# Patient Record
Sex: FEMALE
[De-identification: names, ages, dates, MRNs, and addresses within clinical notes are randomized per-mention and may not be internally consistent; named-entity substitution may affect disease eponyms.]

---

## 2017-03-07 PROBLEM — Z85.828 PERSONAL HISTORY OF OTHER MALIGNANT NEOPLASM OF SKIN: Status: ACTIVE | Noted: 2017-03-07

## 2017-03-18 ENCOUNTER — CHARTING TRANS (OUTPATIENT)
Dept: OTHER | Age: 57
End: 2017-03-18

## 2017-03-18 ENCOUNTER — HOSPITAL (OUTPATIENT)
Dept: OTHER | Age: 57
End: 2017-03-18
Attending: SURGERY

## 2017-03-31 PROBLEM — M16.11 ARTHRITIS OF RIGHT HIP: Status: ACTIVE | Noted: 2017-03-31

## 2018-01-30 PROBLEM — M47.816 LUMBAR SPONDYLOSIS: Status: ACTIVE | Noted: 2018-01-30

## 2018-01-30 PROBLEM — M48.061 FORAMINAL STENOSIS OF LUMBAR REGION: Status: ACTIVE | Noted: 2018-01-30

## 2018-02-14 PROBLEM — Z12.11 SCREENING FOR MALIGNANT NEOPLASM OF COLON: Status: ACTIVE | Noted: 2018-02-14

## 2018-06-01 PROCEDURE — 82746 ASSAY OF FOLIC ACID SERUM: CPT | Performed by: INTERNAL MEDICINE

## 2018-06-01 PROCEDURE — 82607 VITAMIN B-12: CPT | Performed by: INTERNAL MEDICINE

## 2018-07-11 ENCOUNTER — HOSPITAL ENCOUNTER (EMERGENCY)
Facility: HOSPITAL | Age: 58
Discharge: HOME | End: 2018-07-11
Attending: EMERGENCY MEDICINE
Payer: COMMERCIAL

## 2018-07-11 VITALS
HEART RATE: 72 BPM | BODY MASS INDEX: 26.68 KG/M2 | TEMPERATURE: 99 F | WEIGHT: 170 LBS | DIASTOLIC BLOOD PRESSURE: 85 MMHG | HEIGHT: 67 IN | SYSTOLIC BLOOD PRESSURE: 135 MMHG | RESPIRATION RATE: 18 BRPM

## 2018-07-11 DIAGNOSIS — B02.30 ZOSTER OPHTHALMICUS: Primary | ICD-10-CM

## 2018-07-11 DIAGNOSIS — L03.211 CELLULITIS OF FACE: ICD-10-CM

## 2018-07-11 PROCEDURE — 99283 EMERGENCY DEPT VISIT LOW MDM: CPT

## 2018-07-11 RX ORDER — CLINDAMYCIN HYDROCHLORIDE 300 MG/1
CAPSULE ORAL 3 TIMES DAILY
COMMUNITY
End: 2018-09-09 | Stop reason: ALTCHOICE

## 2018-07-11 RX ORDER — CEPHALEXIN 500 MG/1
500 CAPSULE ORAL 3 TIMES DAILY
Qty: 21 CAPSULE | Refills: 0 | Status: SHIPPED | OUTPATIENT
Start: 2018-07-11 | End: 2018-07-25

## 2018-07-11 RX ORDER — CEPHALEXIN 500 MG/1
500 CAPSULE ORAL ONCE
Status: COMPLETED | OUTPATIENT
Start: 2018-07-11 | End: 2018-07-11

## 2018-07-11 RX ORDER — VALACYCLOVIR HYDROCHLORIDE 1 G/1
TABLET, FILM COATED ORAL EVERY 12 HOURS SCHEDULED
COMMUNITY
End: 2018-09-09 | Stop reason: ALTCHOICE

## 2018-07-11 NOTE — ED PROVIDER NOTES
Patient Seen in: [facility] Emergency Department    History   Patient presents with:  Rash Skin Problem (integumentary)    Stated Complaint: poss cellulitis, poss shingles    HPI    21-year-old female presents to the emergency department for evaluat reviewed and negative except as noted above.     Physical Exam   ED Triage Vitals [07/11/18 0039]  BP: 135/85  Pulse: 72  Resp: 18  Temp: 98.7 °F (37.1 °C)  Temp src: Temporal  SpO2: n/a  O2 Device: n/a    Current:/85   Pulse 72   Temp 98.7 °F (37.1 °

## 2018-07-11 NOTE — ED NOTES
Pt is currently being treated for \"shingles and cellulitis\" to rt side face and rt eye. Pt states she feels cellulitis is spreading to left side now. Pt has no other complaints.

## 2018-09-09 PROCEDURE — 87660 TRICHOMONAS VAGIN DIR PROBE: CPT | Performed by: PHYSICIAN ASSISTANT

## 2018-09-09 PROCEDURE — 87510 GARDNER VAG DNA DIR PROBE: CPT | Performed by: PHYSICIAN ASSISTANT

## 2018-09-09 PROCEDURE — 87252 VIRUS INOCULATION TISSUE: CPT | Performed by: PHYSICIAN ASSISTANT

## 2018-09-09 PROCEDURE — 87480 CANDIDA DNA DIR PROBE: CPT | Performed by: PHYSICIAN ASSISTANT

## 2018-12-14 RX ORDER — HYDROCODONE BITARTRATE AND ACETAMINOPHEN 5; 325 MG/1; MG/1
1 TABLET ORAL EVERY 6 HOURS PRN
Status: ON HOLD | COMMUNITY
End: 2019-01-10

## 2018-12-22 ENCOUNTER — LABORATORY ENCOUNTER (OUTPATIENT)
Dept: LAB | Facility: HOSPITAL | Age: 58
End: 2018-12-22
Payer: COMMERCIAL

## 2018-12-22 DIAGNOSIS — Z01.818 PRE-OP TESTING: ICD-10-CM

## 2018-12-22 DIAGNOSIS — E55.9 VITAMIN D DEFICIENCY: ICD-10-CM

## 2018-12-22 DIAGNOSIS — M16.11 PRIMARY OSTEOARTHRITIS OF RIGHT HIP: ICD-10-CM

## 2018-12-22 PROCEDURE — 86900 BLOOD TYPING SEROLOGIC ABO: CPT

## 2018-12-22 PROCEDURE — 82306 VITAMIN D 25 HYDROXY: CPT

## 2018-12-22 PROCEDURE — 81001 URINALYSIS AUTO W/SCOPE: CPT

## 2018-12-22 PROCEDURE — 36415 COLL VENOUS BLD VENIPUNCTURE: CPT

## 2018-12-22 PROCEDURE — 86901 BLOOD TYPING SEROLOGIC RH(D): CPT

## 2018-12-22 PROCEDURE — 87081 CULTURE SCREEN ONLY: CPT

## 2018-12-22 PROCEDURE — 86850 RBC ANTIBODY SCREEN: CPT

## 2018-12-22 PROCEDURE — 85025 COMPLETE CBC W/AUTO DIFF WBC: CPT

## 2018-12-22 PROCEDURE — 87086 URINE CULTURE/COLONY COUNT: CPT

## 2018-12-22 PROCEDURE — 80053 COMPREHEN METABOLIC PANEL: CPT

## 2018-12-27 ENCOUNTER — HOSPITAL ENCOUNTER (OUTPATIENT)
Dept: GENERAL RADIOLOGY | Age: 58
Discharge: HOME | End: 2018-12-27
Attending: ORTHOPAEDIC SURGERY
Payer: COMMERCIAL

## 2018-12-27 DIAGNOSIS — M16.11 PRIMARY OSTEOARTHRITIS OF RIGHT HIP: ICD-10-CM

## 2018-12-27 PROCEDURE — 71046 X-RAY EXAM CHEST 2 VIEWS: CPT | Performed by: ORTHOPAEDIC SURGERY

## 2019-01-04 PROBLEM — M16.11 OSTEOARTHRITIS OF RIGHT HIP: Status: ACTIVE | Noted: 2019-01-04

## 2019-01-04 NOTE — DISCHARGE SUMMARY
Ortho Discharge Summary     Patient ID:  Shivani Davison  [id number]  63year old  8/3/1960      Admit Date:1/10/2019    Discharge Date and Time: 1/11/2019    Attending Physician: Aubrey Santo MD     Reason for admission: right hip DJD    Discharge Diagnos

## 2019-01-09 ENCOUNTER — ANESTHESIA EVENT (OUTPATIENT)
Dept: SURGERY | Facility: HOSPITAL | Age: 59
End: 2019-01-09

## 2019-01-10 ENCOUNTER — HOSPITAL ENCOUNTER (INPATIENT)
Facility: HOSPITAL | Age: 59
LOS: 1 days | Discharge: HOME HEALTH | DRG: 470 | End: 2019-01-11
Attending: ORTHOPAEDIC SURGERY | Admitting: ORTHOPAEDIC SURGERY
Payer: COMMERCIAL

## 2019-01-10 ENCOUNTER — APPOINTMENT (OUTPATIENT)
Dept: GENERAL RADIOLOGY | Facility: HOSPITAL | Age: 59
DRG: 470 | End: 2019-01-10
Attending: PHYSICIAN ASSISTANT
Payer: COMMERCIAL

## 2019-01-10 ENCOUNTER — ANESTHESIA (OUTPATIENT)
Dept: SURGERY | Facility: HOSPITAL | Age: 59
End: 2019-01-10

## 2019-01-10 DIAGNOSIS — M16.11 PRIMARY OSTEOARTHRITIS OF RIGHT HIP: Primary | ICD-10-CM

## 2019-01-10 LAB
ALBUMIN SERPL-MCNC: 2.9 G/DL (ref 3.1–4.5)
ALBUMIN/GLOB SERPL: 1 {RATIO} (ref 1–2)
ALP LIVER SERPL-CCNC: 62 U/L (ref 46–118)
ALT SERPL-CCNC: 27 U/L (ref 14–54)
ANION GAP SERPL CALC-SCNC: 5 MMOL/L (ref 0–18)
AST SERPL-CCNC: 21 U/L (ref 15–41)
BILIRUB SERPL-MCNC: 0.3 MG/DL (ref 0.1–2)
BUN BLD-MCNC: 12 MG/DL (ref 8–20)
BUN/CREAT SERPL: 21.4 (ref 10–20)
CALCIUM BLD-MCNC: 8.1 MG/DL (ref 8.3–10.3)
CHLORIDE SERPL-SCNC: 104 MMOL/L (ref 101–111)
CO2 SERPL-SCNC: 28 MMOL/L (ref 22–32)
CREAT BLD-MCNC: 0.56 MG/DL (ref 0.55–1.02)
ERYTHROCYTE [DISTWIDTH] IN BLOOD BY AUTOMATED COUNT: 12.1 % (ref 11.5–16)
GLOBULIN PLAS-MCNC: 2.8 G/DL (ref 2.8–4.4)
GLUCOSE BLD-MCNC: 143 MG/DL (ref 70–99)
HCT VFR BLD AUTO: 34.3 % (ref 34–50)
HGB BLD-MCNC: 11.1 G/DL (ref 12–16)
M PROTEIN MFR SERPL ELPH: 5.7 G/DL (ref 6.4–8.2)
MCH RBC QN AUTO: 30.5 PG (ref 27–33.2)
MCHC RBC AUTO-ENTMCNC: 32.4 G/DL (ref 31–37)
MCV RBC AUTO: 94.2 FL (ref 81–100)
OSMOLALITY SERPL CALC.SUM OF ELEC: 286 MOSM/KG (ref 275–295)
PLATELET # BLD AUTO: 250 10(3)UL (ref 150–450)
POTASSIUM SERPL-SCNC: 3.6 MMOL/L (ref 3.6–5.1)
RBC # BLD AUTO: 3.64 X10(6)UL (ref 3.8–5.1)
RED CELL DISTRIBUTION WIDTH-SD: 42.5 FL (ref 35.1–46.3)
SODIUM SERPL-SCNC: 137 MMOL/L (ref 136–144)
WBC # BLD AUTO: 11.8 X10(3) UL (ref 4–13)

## 2019-01-10 PROCEDURE — 80053 COMPREHEN METABOLIC PANEL: CPT | Performed by: PHYSICIAN ASSISTANT

## 2019-01-10 PROCEDURE — 97116 GAIT TRAINING THERAPY: CPT

## 2019-01-10 PROCEDURE — 88304 TISSUE EXAM BY PATHOLOGIST: CPT | Performed by: ORTHOPAEDIC SURGERY

## 2019-01-10 PROCEDURE — 73502 X-RAY EXAM HIP UNI 2-3 VIEWS: CPT | Performed by: PHYSICIAN ASSISTANT

## 2019-01-10 PROCEDURE — 88311 DECALCIFY TISSUE: CPT | Performed by: ORTHOPAEDIC SURGERY

## 2019-01-10 PROCEDURE — 85027 COMPLETE CBC AUTOMATED: CPT | Performed by: PHYSICIAN ASSISTANT

## 2019-01-10 PROCEDURE — 97161 PT EVAL LOW COMPLEX 20 MIN: CPT

## 2019-01-10 PROCEDURE — 0SR903Z REPLACEMENT OF RIGHT HIP JOINT WITH CERAMIC SYNTHETIC SUBSTITUTE, OPEN APPROACH: ICD-10-PCS | Performed by: ORTHOPAEDIC SURGERY

## 2019-01-10 DEVICE — SCREW BN 6.5MM 20MM CANC TI: Type: IMPLANTABLE DEVICE | Site: HIP | Status: FUNCTIONAL

## 2019-01-10 DEVICE — IMPLANTABLE DEVICE: Type: IMPLANTABLE DEVICE | Site: HIP | Status: FUNCTIONAL

## 2019-01-10 DEVICE — LINER ACET MP8 NEUT 40MM NLTX: Type: IMPLANTABLE DEVICE | Site: HIP | Status: FUNCTIONAL

## 2019-01-10 DEVICE — SHELL ACET HEMI 54MM STRL HIP: Type: IMPLANTABLE DEVICE | Site: HIP | Status: FUNCTIONAL

## 2019-01-10 DEVICE — SYS THR 12 STNDOF STRL P2 POR: Type: IMPLANTABLE DEVICE | Site: HIP | Status: FUNCTIONAL

## 2019-01-10 DEVICE — SCREW BN 6.5MM 15MM CANC TI: Type: IMPLANTABLE DEVICE | Site: HIP | Status: FUNCTIONAL

## 2019-01-10 RX ORDER — METOCLOPRAMIDE HYDROCHLORIDE 5 MG/ML
10 INJECTION INTRAMUSCULAR; INTRAVENOUS AS NEEDED
Status: DISCONTINUED | OUTPATIENT
Start: 2019-01-10 | End: 2019-01-10 | Stop reason: HOSPADM

## 2019-01-10 RX ORDER — METOCLOPRAMIDE HYDROCHLORIDE 5 MG/ML
10 INJECTION INTRAMUSCULAR; INTRAVENOUS EVERY 6 HOURS PRN
Status: DISCONTINUED | OUTPATIENT
Start: 2019-01-10 | End: 2019-01-11

## 2019-01-10 RX ORDER — HYDROMORPHONE HYDROCHLORIDE 1 MG/ML
INJECTION, SOLUTION INTRAMUSCULAR; INTRAVENOUS; SUBCUTANEOUS
Status: COMPLETED
Start: 2019-01-10 | End: 2019-01-10

## 2019-01-10 RX ORDER — POLYETHYLENE GLYCOL 3350 17 G/17G
17 POWDER, FOR SOLUTION ORAL DAILY PRN
Status: DISCONTINUED | OUTPATIENT
Start: 2019-01-10 | End: 2019-01-11

## 2019-01-10 RX ORDER — SCOLOPAMINE TRANSDERMAL SYSTEM 1 MG/1
PATCH, EXTENDED RELEASE TRANSDERMAL
Status: COMPLETED
Start: 2019-01-10 | End: 2019-01-10

## 2019-01-10 RX ORDER — ACETAMINOPHEN 325 MG/1
650 TABLET ORAL EVERY 6 HOURS SCHEDULED
Status: DISCONTINUED | OUTPATIENT
Start: 2019-01-10 | End: 2019-01-11

## 2019-01-10 RX ORDER — ONDANSETRON 4 MG/1
4 TABLET, FILM COATED ORAL EVERY 8 HOURS PRN
Qty: 20 TABLET | Refills: 0 | Status: SHIPPED | OUTPATIENT
Start: 2019-01-10 | End: 2019-07-19 | Stop reason: ALTCHOICE

## 2019-01-10 RX ORDER — HYDROCODONE BITARTRATE AND ACETAMINOPHEN 5; 325 MG/1; MG/1
2 TABLET ORAL AS NEEDED
Status: DISCONTINUED | OUTPATIENT
Start: 2019-01-10 | End: 2019-01-10 | Stop reason: HOSPADM

## 2019-01-10 RX ORDER — CLINDAMYCIN PHOSPHATE 900 MG/50ML
INJECTION INTRAVENOUS
Status: DISPENSED
Start: 2019-01-10 | End: 2019-01-10

## 2019-01-10 RX ORDER — TRAMADOL HYDROCHLORIDE 50 MG/1
TABLET ORAL EVERY 4 HOURS PRN
Qty: 60 TABLET | Refills: 0 | Status: SHIPPED | OUTPATIENT
Start: 2019-01-10 | End: 2019-07-19 | Stop reason: ALTCHOICE

## 2019-01-10 RX ORDER — DIPHENHYDRAMINE HYDROCHLORIDE 50 MG/ML
12.5 INJECTION INTRAMUSCULAR; INTRAVENOUS AS NEEDED
Status: DISCONTINUED | OUTPATIENT
Start: 2019-01-10 | End: 2019-01-10 | Stop reason: HOSPADM

## 2019-01-10 RX ORDER — MEPERIDINE HYDROCHLORIDE 25 MG/ML
12.5 INJECTION INTRAMUSCULAR; INTRAVENOUS; SUBCUTANEOUS AS NEEDED
Status: DISCONTINUED | OUTPATIENT
Start: 2019-01-10 | End: 2019-01-10 | Stop reason: HOSPADM

## 2019-01-10 RX ORDER — ACETAMINOPHEN 500 MG
1000 TABLET ORAL ONCE
Status: DISCONTINUED | OUTPATIENT
Start: 2019-01-10 | End: 2019-01-10

## 2019-01-10 RX ORDER — MAGNESIUM HYDROXIDE 1200 MG/15ML
LIQUID ORAL CONTINUOUS PRN
Status: COMPLETED | OUTPATIENT
Start: 2019-01-10 | End: 2019-01-10

## 2019-01-10 RX ORDER — SCOLOPAMINE TRANSDERMAL SYSTEM 1 MG/1
1 PATCH, EXTENDED RELEASE TRANSDERMAL ONCE
Status: DISCONTINUED | OUTPATIENT
Start: 2019-01-10 | End: 2019-01-11

## 2019-01-10 RX ORDER — CLINDAMYCIN PHOSPHATE 900 MG/50ML
900 INJECTION INTRAVENOUS EVERY 8 HOURS
Status: COMPLETED | OUTPATIENT
Start: 2019-01-10 | End: 2019-01-11

## 2019-01-10 RX ORDER — FAMOTIDINE 20 MG/1
20 TABLET ORAL 2 TIMES DAILY
Status: DISCONTINUED | OUTPATIENT
Start: 2019-01-10 | End: 2019-01-11

## 2019-01-10 RX ORDER — BISACODYL 10 MG
10 SUPPOSITORY, RECTAL RECTAL
Status: DISCONTINUED | OUTPATIENT
Start: 2019-01-10 | End: 2019-01-11

## 2019-01-10 RX ORDER — DIPHENHYDRAMINE HCL 25 MG
25 CAPSULE ORAL EVERY 4 HOURS PRN
Status: DISCONTINUED | OUTPATIENT
Start: 2019-01-10 | End: 2019-01-11

## 2019-01-10 RX ORDER — SODIUM CHLORIDE, SODIUM LACTATE, POTASSIUM CHLORIDE, CALCIUM CHLORIDE 600; 310; 30; 20 MG/100ML; MG/100ML; MG/100ML; MG/100ML
INJECTION, SOLUTION INTRAVENOUS CONTINUOUS
Status: DISCONTINUED | OUTPATIENT
Start: 2019-01-10 | End: 2019-01-11

## 2019-01-10 RX ORDER — OXYCODONE HYDROCHLORIDE 10 MG/1
TABLET ORAL EVERY 6 HOURS PRN
Qty: 60 TABLET | Refills: 0 | Status: SHIPPED | OUTPATIENT
Start: 2019-01-10 | End: 2019-01-11

## 2019-01-10 RX ORDER — TRAMADOL HYDROCHLORIDE 50 MG/1
100 TABLET ORAL EVERY 6 HOURS PRN
Status: DISCONTINUED | OUTPATIENT
Start: 2019-01-10 | End: 2019-01-11

## 2019-01-10 RX ORDER — TRAMADOL HYDROCHLORIDE 50 MG/1
50 TABLET ORAL EVERY 6 HOURS PRN
Status: DISCONTINUED | OUTPATIENT
Start: 2019-01-10 | End: 2019-01-11

## 2019-01-10 RX ORDER — SODIUM PHOSPHATE, DIBASIC AND SODIUM PHOSPHATE, MONOBASIC 7; 19 G/133ML; G/133ML
1 ENEMA RECTAL ONCE AS NEEDED
Status: DISCONTINUED | OUTPATIENT
Start: 2019-01-10 | End: 2019-01-11

## 2019-01-10 RX ORDER — ASPIRIN 325 MG
325 TABLET, DELAYED RELEASE (ENTERIC COATED) ORAL 2 TIMES DAILY
Qty: 60 TABLET | Refills: 0 | Status: SHIPPED | OUTPATIENT
Start: 2019-01-10 | End: 2019-02-09

## 2019-01-10 RX ORDER — KETOROLAC TROMETHAMINE 30 MG/ML
30 INJECTION, SOLUTION INTRAMUSCULAR; INTRAVENOUS EVERY 8 HOURS
Status: COMPLETED | OUTPATIENT
Start: 2019-01-10 | End: 2019-01-11

## 2019-01-10 RX ORDER — HYDROMORPHONE HYDROCHLORIDE 1 MG/ML
INJECTION, SOLUTION INTRAMUSCULAR; INTRAVENOUS; SUBCUTANEOUS
Status: DISPENSED
Start: 2019-01-10 | End: 2019-01-11

## 2019-01-10 RX ORDER — ASPIRIN 325 MG
325 TABLET ORAL 2 TIMES DAILY
Status: DISCONTINUED | OUTPATIENT
Start: 2019-01-10 | End: 2019-01-11

## 2019-01-10 RX ORDER — DIPHENHYDRAMINE HYDROCHLORIDE 50 MG/ML
25 INJECTION INTRAMUSCULAR; INTRAVENOUS ONCE AS NEEDED
Status: ACTIVE | OUTPATIENT
Start: 2019-01-10 | End: 2019-01-10

## 2019-01-10 RX ORDER — HYDROMORPHONE HYDROCHLORIDE 1 MG/ML
1 INJECTION, SOLUTION INTRAMUSCULAR; INTRAVENOUS; SUBCUTANEOUS EVERY 2 HOUR PRN
Status: DISCONTINUED | OUTPATIENT
Start: 2019-01-10 | End: 2019-01-11

## 2019-01-10 RX ORDER — OXYCODONE HYDROCHLORIDE 10 MG/1
10 TABLET ORAL EVERY 6 HOURS SCHEDULED
Status: DISCONTINUED | OUTPATIENT
Start: 2019-01-10 | End: 2019-01-11

## 2019-01-10 RX ORDER — ACETAMINOPHEN 325 MG/1
650 TABLET ORAL ONCE
Status: COMPLETED | OUTPATIENT
Start: 2019-01-10 | End: 2019-01-10

## 2019-01-10 RX ORDER — ONDANSETRON 2 MG/ML
4 INJECTION INTRAMUSCULAR; INTRAVENOUS EVERY 4 HOURS PRN
Status: DISCONTINUED | OUTPATIENT
Start: 2019-01-10 | End: 2019-01-11

## 2019-01-10 RX ORDER — NALOXONE HYDROCHLORIDE 0.4 MG/ML
80 INJECTION, SOLUTION INTRAMUSCULAR; INTRAVENOUS; SUBCUTANEOUS AS NEEDED
Status: DISCONTINUED | OUTPATIENT
Start: 2019-01-10 | End: 2019-01-10 | Stop reason: HOSPADM

## 2019-01-10 RX ORDER — HYDROCODONE BITARTRATE AND ACETAMINOPHEN 5; 325 MG/1; MG/1
1 TABLET ORAL AS NEEDED
Status: DISCONTINUED | OUTPATIENT
Start: 2019-01-10 | End: 2019-01-10 | Stop reason: HOSPADM

## 2019-01-10 RX ORDER — DOCUSATE SODIUM 100 MG/1
100 CAPSULE, LIQUID FILLED ORAL 2 TIMES DAILY
Qty: 60 CAPSULE | Refills: 2 | Status: SHIPPED | OUTPATIENT
Start: 2019-01-10 | End: 2019-07-19 | Stop reason: ALTCHOICE

## 2019-01-10 RX ORDER — HYDROMORPHONE HYDROCHLORIDE 1 MG/ML
0.5 INJECTION, SOLUTION INTRAMUSCULAR; INTRAVENOUS; SUBCUTANEOUS EVERY 2 HOUR PRN
Status: DISCONTINUED | OUTPATIENT
Start: 2019-01-10 | End: 2019-01-11

## 2019-01-10 RX ORDER — IBUPROFEN 600 MG/1
600 TABLET ORAL EVERY 8 HOURS PRN
Qty: 90 TABLET | Refills: 2 | Status: SHIPPED | OUTPATIENT
Start: 2019-01-10 | End: 2019-07-19 | Stop reason: ALTCHOICE

## 2019-01-10 RX ORDER — HYDROMORPHONE HYDROCHLORIDE 1 MG/ML
INJECTION, SOLUTION INTRAMUSCULAR; INTRAVENOUS; SUBCUTANEOUS
Status: DISCONTINUED
Start: 2019-01-10 | End: 2019-01-10 | Stop reason: WASHOUT

## 2019-01-10 RX ORDER — GABAPENTIN 100 MG/1
100 CAPSULE ORAL 3 TIMES DAILY
Qty: 60 CAPSULE | Refills: 0 | Status: SHIPPED | OUTPATIENT
Start: 2019-01-10 | End: 2019-03-08 | Stop reason: ALTCHOICE

## 2019-01-10 RX ORDER — DOCUSATE SODIUM 100 MG/1
100 CAPSULE, LIQUID FILLED ORAL 2 TIMES DAILY
Status: DISCONTINUED | OUTPATIENT
Start: 2019-01-10 | End: 2019-01-11

## 2019-01-10 RX ORDER — HYDROMORPHONE HYDROCHLORIDE 1 MG/ML
0.4 INJECTION, SOLUTION INTRAMUSCULAR; INTRAVENOUS; SUBCUTANEOUS EVERY 5 MIN PRN
Status: DISCONTINUED | OUTPATIENT
Start: 2019-01-10 | End: 2019-01-10 | Stop reason: HOSPADM

## 2019-01-10 RX ORDER — LABETALOL HYDROCHLORIDE 5 MG/ML
5 INJECTION, SOLUTION INTRAVENOUS EVERY 5 MIN PRN
Status: DISCONTINUED | OUTPATIENT
Start: 2019-01-10 | End: 2019-01-10 | Stop reason: HOSPADM

## 2019-01-10 RX ORDER — GABAPENTIN 100 MG/1
100 CAPSULE ORAL 3 TIMES DAILY
Status: DISCONTINUED | OUTPATIENT
Start: 2019-01-10 | End: 2019-01-11

## 2019-01-10 RX ORDER — ONDANSETRON 2 MG/ML
4 INJECTION INTRAMUSCULAR; INTRAVENOUS AS NEEDED
Status: DISCONTINUED | OUTPATIENT
Start: 2019-01-10 | End: 2019-01-10 | Stop reason: HOSPADM

## 2019-01-10 RX ORDER — PANTOPRAZOLE SODIUM 40 MG/1
40 TABLET, DELAYED RELEASE ORAL
Qty: 30 TABLET | Refills: 0 | Status: SHIPPED | OUTPATIENT
Start: 2019-01-10 | End: 2019-07-19 | Stop reason: ALTCHOICE

## 2019-01-10 RX ORDER — DIPHENHYDRAMINE HYDROCHLORIDE 50 MG/ML
12.5 INJECTION INTRAMUSCULAR; INTRAVENOUS EVERY 4 HOURS PRN
Status: DISCONTINUED | OUTPATIENT
Start: 2019-01-10 | End: 2019-01-11

## 2019-01-10 RX ORDER — TRAMADOL HYDROCHLORIDE 50 MG/1
TABLET ORAL EVERY 6 HOURS PRN
Status: DISCONTINUED | OUTPATIENT
Start: 2019-01-10 | End: 2019-01-10 | Stop reason: SDUPTHER

## 2019-01-10 RX ORDER — CLINDAMYCIN PHOSPHATE 900 MG/50ML
900 INJECTION INTRAVENOUS ONCE
Status: COMPLETED | OUTPATIENT
Start: 2019-01-10 | End: 2019-01-10

## 2019-01-10 RX ORDER — IBUPROFEN 600 MG/1
600 TABLET ORAL 3 TIMES DAILY
Status: DISCONTINUED | OUTPATIENT
Start: 2019-01-11 | End: 2019-01-11

## 2019-01-10 RX ORDER — SODIUM CHLORIDE, SODIUM LACTATE, POTASSIUM CHLORIDE, CALCIUM CHLORIDE 600; 310; 30; 20 MG/100ML; MG/100ML; MG/100ML; MG/100ML
INJECTION, SOLUTION INTRAVENOUS CONTINUOUS
Status: DISCONTINUED | OUTPATIENT
Start: 2019-01-10 | End: 2019-01-10 | Stop reason: HOSPADM

## 2019-01-10 RX ORDER — ACETAMINOPHEN 325 MG/1
TABLET ORAL
Status: COMPLETED
Start: 2019-01-10 | End: 2019-01-10

## 2019-01-10 NOTE — CONSULTS
[facility] Hospitalist Initial Consult       Reason for Consult: Medical Management sp DANIEL      History of Present Illness: Patient is a 63year old female with PMH sig for OA, GERD, ANTONETTE who presents sp the above procedure.  She tolerated the procedure well withou Performed by Tonya Garcia MD at [address] [place] [address] 1/30/2018    Performed by Tonya Garcia MD at [address] [place] [address] N/A 12/15/2017    Performed by Tonya Garcia MD at [address] [place] [address]  hours. No results for input(s): TROP in the last 168 hours. ASSESSMENT / PLAN:    S/P DANIEL  - Post op care per ortho. Continue PT/OT.      Post op pain  - IV narcotics prn, transition to PO as tolerated    GERD: home meds  ANTONETTE: Doesn't wear CPAP at

## 2019-01-10 NOTE — H&P
History and Physical: Brief Update    I have reviewed the history and physical performed within the last 30 days, Dr. Allen Mask 12/22/18. I agree with this assessment and have no further additions. The consent form is signed and present in the chart.

## 2019-01-10 NOTE — ANESTHESIA POSTPROCEDURE EVALUATION
[facility] Patient Status:  Surgery Admit   Age/Gender 63year old female MRN [id number]   Location [facility] Attending Anisa Larios MD   Hosp Day # 0 PCP Quintin Brewer MD       Anesthesia Post

## 2019-01-10 NOTE — ANESTHESIA PREPROCEDURE EVALUATION
PRE-OP EVALUATION    Patient Name: Patrick Donato    Pre-op Diagnosis: RIGHT HIP DEGENERATIVE JOINT DISEASE    Procedure(s):  RIGHT TOTAL HIP ARTHROPLASTY    Surgeon(s) and Role: Hernandez Dai MD - Primary    Pre-op vitals reviewed.   Temp: 98 °F (36 Endo/Other                           (+) arthritis       Pulmonary                    (-) sleep apnea (resolved)       Neuro/Psych    Negative neuro/psych ROS.                                 Past Surgical History:   Procedure Laterality Date verified and patient meets guidelines. Patient has not taken beta blockers in last 24 hours. Plan/risks discussed with: patient and spouse  Use of blood product(s) discussed with: patient    Consented to blood products.           Present on Admissio

## 2019-01-11 VITALS
BODY MASS INDEX: 28.75 KG/M2 | RESPIRATION RATE: 18 BRPM | HEIGHT: 67 IN | HEART RATE: 72 BPM | DIASTOLIC BLOOD PRESSURE: 65 MMHG | SYSTOLIC BLOOD PRESSURE: 119 MMHG | TEMPERATURE: 98 F | OXYGEN SATURATION: 98 % | WEIGHT: 183.19 LBS

## 2019-01-11 PROCEDURE — 97165 OT EVAL LOW COMPLEX 30 MIN: CPT

## 2019-01-11 PROCEDURE — 97150 GROUP THERAPEUTIC PROCEDURES: CPT

## 2019-01-11 PROCEDURE — 97535 SELF CARE MNGMENT TRAINING: CPT

## 2019-01-11 PROCEDURE — 97116 GAIT TRAINING THERAPY: CPT

## 2019-01-11 RX ORDER — DIAZEPAM 5 MG/1
5 TABLET ORAL EVERY 8 HOURS PRN
Status: DISCONTINUED | OUTPATIENT
Start: 2019-01-11 | End: 2019-01-11

## 2019-01-11 RX ORDER — POTASSIUM CHLORIDE 20 MEQ/1
40 TABLET, EXTENDED RELEASE ORAL EVERY 4 HOURS
Status: COMPLETED | OUTPATIENT
Start: 2019-01-11 | End: 2019-01-11

## 2019-01-11 RX ORDER — HYDROCODONE BITARTRATE AND ACETAMINOPHEN 10; 325 MG/1; MG/1
1 TABLET ORAL EVERY 6 HOURS PRN
Status: DISCONTINUED | OUTPATIENT
Start: 2019-01-11 | End: 2019-01-11

## 2019-01-11 RX ORDER — DIAZEPAM 5 MG/1
5 TABLET ORAL EVERY 8 HOURS PRN
Qty: 40 TABLET | Refills: 0 | Status: SHIPPED | OUTPATIENT
Start: 2019-01-11 | End: 2019-03-08 | Stop reason: ALTCHOICE

## 2019-01-11 RX ORDER — HYDROCODONE BITARTRATE AND ACETAMINOPHEN 10; 325 MG/1; MG/1
2 TABLET ORAL EVERY 6 HOURS PRN
Status: DISCONTINUED | OUTPATIENT
Start: 2019-01-11 | End: 2019-01-11

## 2019-01-11 RX ORDER — HYDROCODONE BITARTRATE AND ACETAMINOPHEN 10; 325 MG/1; MG/1
1-2 TABLET ORAL EVERY 6 HOURS PRN
Qty: 60 TABLET | Refills: 0 | Status: SHIPPED | OUTPATIENT
Start: 2019-01-11 | End: 2019-07-19 | Stop reason: ALTCHOICE

## 2019-01-11 RX ORDER — HYDROCODONE BITARTRATE AND ACETAMINOPHEN 10; 325 MG/1; MG/1
1-2 TABLET ORAL EVERY 6 HOURS PRN
Status: DISCONTINUED | OUTPATIENT
Start: 2019-01-11 | End: 2019-01-11

## 2019-01-11 NOTE — PROGRESS NOTES
NURSING DISCHARGE NOTE    Discharged via wheelchair  Accompanied by partner  Belongings all sent with the patient. All of patient's medications were filled in using the outpatient pharmacy.  Instructions were discussed with the patient and she verbalize

## 2019-01-11 NOTE — PAYOR COMM NOTE
--------------  ADMISSION REVIEW         1/10    DIRECT FOR OR            Procedure(s):  RIGHT TOTAL HIP ARTHROPLASTY     Patient Location: PACU

## 2019-01-11 NOTE — PHYSICAL THERAPY NOTE
PHYSICAL THERAPY HIP TREATMENT NOTE - INPATIENT      Room Number: 366/366-A     Session: 1  Number of Visits to Meet Established Goals: 1    Presenting Problem: s/p right DANIEL 1/10/19    Problem List  Principal Problem:    Osteoarthritis of right hip  Activ O2 WALK                  AM-PAC '6-Clicks' INPATIENT SHORT FORM - BASIC MOBILITY  How much difficulty does the patient currently have. ..  -   Turning over in bed (including adjusting bedclothes, sheets and blankets)?: None   -   Sitting down on a was present yes   is a     Patient End of Session: Up in chair; With [facility] staff;Needs met    ASSESSMENT   The pt is progressing very well in functional mobility and activity tolerance. Pt will benefit from OPPT.       DISCHARGE RECOMMENDATIONS  PT

## 2019-01-11 NOTE — PROGRESS NOTES
[facility]    Progress Note    Robbie Huerta Patient Status:  Inpatient    8/3/1960 MRN [id number]   [facility] 3SW-A Attending Suzy Meza MD   Hosp Day # 1 PCP Reena Hannon MD       Subjective:   Robbie Huerta is a(n) 63 MD Hipolito Edge PA-C  1/11/2019    Hipolito Nunez PA-C  Physician Assistant for Dr. Thalia Jamesr: [phone]  F: [phone]  C: [phone]

## 2019-01-11 NOTE — PHYSICAL THERAPY NOTE
PHYSICAL THERAPY HIP EVALUATION - INPATIENT     Room Number: 366/366-A  Evaluation Date: 1/10/2019  Type of Evaluation: Initial  Physician Order: PT Eval and Treat    Presenting Problem: s/p right DANIEL 1/10/19  Reason for Therapy: Mobility Dysfunction and D am ready to get out of this bed:\"    Patient self-stated goal is to go home    OBJECTIVE  Precautions: ADNIEL - posterior  Fall Risk: Standard fall risk    WEIGHT BEARING RESTRICTION  Weight Bearing Restriction: R lower extremity        R Lower Extremity:  We walker  Pattern: R Decreased stance time  Stoop/Curb Assistance: Not tested       Skilled Therapy Provided:  Pt seen in pt room, recd in supine, boyfriend and son present. Pt educated in role of PT, ankle pumps, quad sets, goals for session.  Pt able to pe and overall the evaluation complexity is considered low. These impairments and comorbidities manifest themselves as functional limitations in independent bed mobility, transfers, and gait.   The patient is below baseline and would benefit from skilled inpa

## 2019-01-11 NOTE — CM/SW NOTE
AVS sent to [address] [place] [address]      01/11/19 153   Discharge disposition   Expected discharge disposition Home-Health   Name of Facillity/Home Care/Hospice ([facility])   Discharge transportation Private car

## 2019-01-11 NOTE — FACE TO FACE NOTE
Face to Face Encounter    I (or a non-physician practitioner working in collaboration with me) had a face to face encounter with this patient during which a medical condition was addressed which is the primary reason for home health care on : 1/11/2019

## 2019-01-11 NOTE — OCCUPATIONAL THERAPY NOTE
OCCUPATIONAL THERAPY QUICK EVALUATION - INPATIENT    Room Number: 366/366-A  Evaluation Date: 1/11/2019     Type of Evaluation: Quick Eval  Presenting Problem: s/p R DANIEL 1/10/19    Physician Order: IP Consult to Occupational Therapy  Reason for Therapy:  A shower; Shower chair  Other Equipment: None    Occupation/Status: Marketing, travels frequently for work     Drives: Yes  Patient Regularly Uses: Glasses    Prior Level of Function: Patient reports independent in all I/ADL and functional mobility without de incorporation into ADLs; patient required min cueing to follow throughout session; education on bed mobility, performed Mod I with increased time due to pain; education on LB dressing techniques/equipment, performed LB dressing to knees SBA with use of agnieszka Home(with family assist)  OT Device Recommendations: Reacher;Sock aid    PLAN   Patient has been evaluated and presents with no skilled Occupational Therapy needs at this time. Patient discharged from Occupational Therapy services.   Please re-order if a n

## 2019-01-11 NOTE — CM/SW NOTE
01/11/19 1100   CM/SW Referral Data   Referral Source Physician   Reason for Referral Discharge planning   Informant Patient   Pertinent Medical Hx   Primary Care Physician Name Katie Cheng   Patient Info   Patient's Mental Status Alert;Oriented

## 2019-01-11 NOTE — CM/SW NOTE
[facility] not able to accept pt due to insurance. Referral sent via [facility] to [address] [place] [address] [phone] and they are able to accept pt. Will send AVS when completed. Pt aware.      Paula Gleason RN, [facility]  Spectra [phone]

## 2019-01-13 NOTE — OPERATIVE REPORT
[facility], [address] [place] [address] NAME: Petey Beckwith\f0MR#: [id number] MD: Katie Parkinson OF PROCEDURE: 1/10/2019\[id number] DIAGNOSIS: Degenerative Arthritis Right Hip\f0POS component was able to be placed in stable position. \[id number]: The patient was admitted to the same day surgery program on Thursday, January 10, 2019.  Following proper identification in the preoperative holding area with confirmation of the above-stated component was placed in 45 degrees of verticality and 20 degrees of anteversion. This was seated and gave excellent initial stability. 2 Screws were used. Subsequently, a 54/40 polyethylene liner was inserted. was now turned to the femur.  The femur was expo

## 2019-01-15 NOTE — PROGRESS NOTES
[facility] Hospitalist Progress Note                                                                   [facility]  8/3/1960    CC: FU post op    Interval History:  - Doing well      Current Med

## 2019-01-18 NOTE — PAYOR COMM NOTE
--------------  DISCHARGE REVIEW    Payor: Ronda Phillips #:  [id number]  Authorization Number: [id number]    Admit date: 1/10/19  Admit time:  36  Discharge Date: 1/11/2019  4:11 PM     Admitting Physician: Duarte Hall MD  Attending Phys

## 2019-03-12 PROCEDURE — 86200 CCP ANTIBODY: CPT | Performed by: INTERNAL MEDICINE

## 2019-03-27 PROBLEM — M54.2 NECK PAIN: Status: ACTIVE | Noted: 2019-03-27

## 2020-02-08 ENCOUNTER — APPOINTMENT (OUTPATIENT)
Dept: ULTRASOUND IMAGING | Facility: HOSPITAL | Age: 60
End: 2020-02-08
Attending: EMERGENCY MEDICINE
Payer: COMMERCIAL

## 2020-02-08 ENCOUNTER — ANESTHESIA EVENT (OUTPATIENT)
Dept: SURGERY | Facility: HOSPITAL | Age: 60
End: 2020-02-08
Payer: COMMERCIAL

## 2020-02-08 ENCOUNTER — HOSPITAL ENCOUNTER (OUTPATIENT)
Facility: HOSPITAL | Age: 60
Setting detail: OBSERVATION
Discharge: HOME | End: 2020-02-10
Attending: EMERGENCY MEDICINE | Admitting: INTERNAL MEDICINE
Payer: COMMERCIAL

## 2020-02-08 ENCOUNTER — APPOINTMENT (OUTPATIENT)
Dept: CT IMAGING | Facility: HOSPITAL | Age: 60
End: 2020-02-08
Attending: EMERGENCY MEDICINE
Payer: COMMERCIAL

## 2020-02-08 DIAGNOSIS — K81.0 ACUTE CHOLECYSTITIS: ICD-10-CM

## 2020-02-08 DIAGNOSIS — K80.50 BILIARY COLIC: Primary | ICD-10-CM

## 2020-02-08 LAB
ALBUMIN SERPL-MCNC: 3.5 G/DL (ref 3.4–5)
ALBUMIN/GLOB SERPL: 0.9 {RATIO} (ref 1–2)
ALP LIVER SERPL-CCNC: 91 U/L (ref 46–118)
ALT SERPL-CCNC: 34 U/L (ref 13–56)
ANION GAP SERPL CALC-SCNC: 5 MMOL/L (ref 0–18)
AST SERPL-CCNC: 20 U/L (ref 15–37)
BASOPHILS # BLD AUTO: 0.08 X10(3) UL (ref 0–0.2)
BASOPHILS NFR BLD AUTO: 0.6 %
BILIRUB SERPL-MCNC: 0.3 MG/DL (ref 0.1–2)
BILIRUB UR QL STRIP.AUTO: NEGATIVE
BUN BLD-MCNC: 24 MG/DL (ref 7–18)
BUN/CREAT SERPL: 32.9 (ref 10–20)
CALCIUM BLD-MCNC: 9.9 MG/DL (ref 8.5–10.1)
CHLORIDE SERPL-SCNC: 109 MMOL/L (ref 98–112)
CLARITY UR REFRACT.AUTO: CLEAR
CO2 SERPL-SCNC: 25 MMOL/L (ref 21–32)
COLOR UR AUTO: YELLOW
CREAT BLD-MCNC: 0.73 MG/DL (ref 0.55–1.02)
DEPRECATED RDW RBC AUTO: 41.3 FL (ref 35.1–46.3)
EOSINOPHIL # BLD AUTO: 0.59 X10(3) UL (ref 0–0.7)
EOSINOPHIL NFR BLD AUTO: 4.2 %
ERYTHROCYTE [DISTWIDTH] IN BLOOD BY AUTOMATED COUNT: 12.1 % (ref 11–15)
GLOBULIN PLAS-MCNC: 3.8 G/DL (ref 2.8–4.4)
GLUCOSE BLD-MCNC: 154 MG/DL (ref 70–99)
GLUCOSE UR STRIP.AUTO-MCNC: NEGATIVE MG/DL
HCT VFR BLD AUTO: 43 % (ref 35–48)
HGB BLD-MCNC: 14.2 G/DL (ref 12–16)
IMM GRANULOCYTES # BLD AUTO: 0.06 X10(3) UL (ref 0–1)
IMM GRANULOCYTES NFR BLD: 0.4 %
KETONES UR STRIP.AUTO-MCNC: NEGATIVE MG/DL
LIPASE SERPL-CCNC: 79 U/L (ref 73–393)
LYMPHOCYTES # BLD AUTO: 2.95 X10(3) UL (ref 1–4)
LYMPHOCYTES NFR BLD AUTO: 21.2 %
M PROTEIN MFR SERPL ELPH: 7.3 G/DL (ref 6.4–8.2)
MCH RBC QN AUTO: 30.6 PG (ref 26–34)
MCHC RBC AUTO-ENTMCNC: 33 G/DL (ref 31–37)
MCV RBC AUTO: 92.7 FL (ref 80–100)
MONOCYTES # BLD AUTO: 0.76 X10(3) UL (ref 0.1–1)
MONOCYTES NFR BLD AUTO: 5.5 %
NEUTROPHILS # BLD AUTO: 9.5 X10 (3) UL (ref 1.5–7.7)
NEUTROPHILS # BLD AUTO: 9.5 X10(3) UL (ref 1.5–7.7)
NEUTROPHILS NFR BLD AUTO: 68.1 %
NITRITE UR QL STRIP.AUTO: NEGATIVE
OSMOLALITY SERPL CALC.SUM OF ELEC: 295 MOSM/KG (ref 275–295)
PH UR STRIP.AUTO: 6 [PH] (ref 4.5–8)
PLATELET # BLD AUTO: 314 10(3)UL (ref 150–450)
POTASSIUM SERPL-SCNC: 3.8 MMOL/L (ref 3.5–5.1)
PROT UR STRIP.AUTO-MCNC: NEGATIVE MG/DL
RBC # BLD AUTO: 4.64 X10(6)UL (ref 3.8–5.3)
RBC UR QL AUTO: NEGATIVE
SODIUM SERPL-SCNC: 139 MMOL/L (ref 136–145)
SP GR UR STRIP.AUTO: 1.02 (ref 1–1.03)
UROBILINOGEN UR STRIP.AUTO-MCNC: <2 MG/DL
WBC # BLD AUTO: 13.9 X10(3) UL (ref 4–11)

## 2020-02-08 PROCEDURE — 96374 THER/PROPH/DIAG INJ IV PUSH: CPT

## 2020-02-08 PROCEDURE — 94660 CPAP INITIATION&MGMT: CPT

## 2020-02-08 PROCEDURE — 93010 ELECTROCARDIOGRAM REPORT: CPT | Performed by: INTERNAL MEDICINE

## 2020-02-08 PROCEDURE — 87086 URINE CULTURE/COLONY COUNT: CPT | Performed by: INTERNAL MEDICINE

## 2020-02-08 PROCEDURE — 80053 COMPREHEN METABOLIC PANEL: CPT | Performed by: EMERGENCY MEDICINE

## 2020-02-08 PROCEDURE — 93005 ELECTROCARDIOGRAM TRACING: CPT

## 2020-02-08 PROCEDURE — 83690 ASSAY OF LIPASE: CPT | Performed by: EMERGENCY MEDICINE

## 2020-02-08 PROCEDURE — 81001 URINALYSIS AUTO W/SCOPE: CPT | Performed by: INTERNAL MEDICINE

## 2020-02-08 PROCEDURE — 96375 TX/PRO/DX INJ NEW DRUG ADDON: CPT

## 2020-02-08 PROCEDURE — 74176 CT ABD & PELVIS W/O CONTRAST: CPT | Performed by: EMERGENCY MEDICINE

## 2020-02-08 PROCEDURE — 85025 COMPLETE CBC W/AUTO DIFF WBC: CPT | Performed by: EMERGENCY MEDICINE

## 2020-02-08 PROCEDURE — 99285 EMERGENCY DEPT VISIT HI MDM: CPT

## 2020-02-08 PROCEDURE — 76700 US EXAM ABDOM COMPLETE: CPT | Performed by: EMERGENCY MEDICINE

## 2020-02-08 RX ORDER — MORPHINE SULFATE 4 MG/ML
2 INJECTION, SOLUTION INTRAMUSCULAR; INTRAVENOUS EVERY 2 HOUR PRN
Status: DISCONTINUED | OUTPATIENT
Start: 2020-02-08 | End: 2020-02-09 | Stop reason: HOSPADM

## 2020-02-08 RX ORDER — MORPHINE SULFATE 4 MG/ML
4 INJECTION, SOLUTION INTRAMUSCULAR; INTRAVENOUS ONCE
Status: COMPLETED | OUTPATIENT
Start: 2020-02-08 | End: 2020-02-08

## 2020-02-08 RX ORDER — MORPHINE SULFATE 4 MG/ML
4 INJECTION, SOLUTION INTRAMUSCULAR; INTRAVENOUS EVERY 2 HOUR PRN
Status: DISCONTINUED | OUTPATIENT
Start: 2020-02-08 | End: 2020-02-09 | Stop reason: HOSPADM

## 2020-02-08 RX ORDER — HYDROMORPHONE HYDROCHLORIDE 1 MG/ML
0.5 INJECTION, SOLUTION INTRAMUSCULAR; INTRAVENOUS; SUBCUTANEOUS ONCE
Status: COMPLETED | OUTPATIENT
Start: 2020-02-08 | End: 2020-02-08

## 2020-02-08 RX ORDER — PANTOPRAZOLE SODIUM 20 MG/1
20 TABLET, DELAYED RELEASE ORAL
Status: DISCONTINUED | OUTPATIENT
Start: 2020-02-09 | End: 2020-02-09 | Stop reason: HOSPADM

## 2020-02-08 RX ORDER — METOCLOPRAMIDE HYDROCHLORIDE 5 MG/ML
10 INJECTION INTRAMUSCULAR; INTRAVENOUS ONCE
Status: COMPLETED | OUTPATIENT
Start: 2020-02-08 | End: 2020-02-08

## 2020-02-08 RX ORDER — OMEPRAZOLE 20 MG/1
20 CAPSULE, DELAYED RELEASE ORAL
COMMUNITY
End: 2020-03-13

## 2020-02-08 RX ORDER — ONDANSETRON 2 MG/ML
4 INJECTION INTRAMUSCULAR; INTRAVENOUS EVERY 6 HOURS PRN
Status: DISCONTINUED | OUTPATIENT
Start: 2020-02-08 | End: 2020-02-09 | Stop reason: HOSPADM

## 2020-02-08 RX ORDER — HYDROMORPHONE HYDROCHLORIDE 1 MG/ML
0.5 INJECTION, SOLUTION INTRAMUSCULAR; INTRAVENOUS; SUBCUTANEOUS EVERY 30 MIN PRN
Status: CANCELLED | OUTPATIENT
Start: 2020-02-08 | End: 2020-02-08

## 2020-02-08 RX ORDER — ONDANSETRON 2 MG/ML
4 INJECTION INTRAMUSCULAR; INTRAVENOUS EVERY 4 HOURS PRN
Status: CANCELLED | OUTPATIENT
Start: 2020-02-08

## 2020-02-08 RX ORDER — ONDANSETRON 2 MG/ML
4 INJECTION INTRAMUSCULAR; INTRAVENOUS ONCE
Status: COMPLETED | OUTPATIENT
Start: 2020-02-08 | End: 2020-02-08

## 2020-02-08 RX ORDER — KETOROLAC TROMETHAMINE 30 MG/ML
30 INJECTION, SOLUTION INTRAMUSCULAR; INTRAVENOUS ONCE
Status: COMPLETED | OUTPATIENT
Start: 2020-02-08 | End: 2020-02-08

## 2020-02-08 RX ORDER — MORPHINE SULFATE 4 MG/ML
1 INJECTION, SOLUTION INTRAMUSCULAR; INTRAVENOUS EVERY 2 HOUR PRN
Status: DISCONTINUED | OUTPATIENT
Start: 2020-02-08 | End: 2020-02-09 | Stop reason: HOSPADM

## 2020-02-08 RX ORDER — HEPARIN SODIUM 5000 [USP'U]/ML
5000 INJECTION, SOLUTION INTRAVENOUS; SUBCUTANEOUS EVERY 8 HOURS SCHEDULED
Status: DISCONTINUED | OUTPATIENT
Start: 2020-02-08 | End: 2020-02-09 | Stop reason: HOSPADM

## 2020-02-08 RX ORDER — ACETAMINOPHEN 325 MG/1
650 TABLET ORAL EVERY 6 HOURS PRN
Status: DISCONTINUED | OUTPATIENT
Start: 2020-02-08 | End: 2020-02-09 | Stop reason: HOSPADM

## 2020-02-08 RX ORDER — SODIUM CHLORIDE 9 MG/ML
INJECTION, SOLUTION INTRAVENOUS CONTINUOUS
Status: DISCONTINUED | OUTPATIENT
Start: 2020-02-08 | End: 2020-02-09 | Stop reason: HOSPADM

## 2020-02-08 RX ORDER — KETOROLAC TROMETHAMINE 15 MG/ML
15 INJECTION, SOLUTION INTRAMUSCULAR; INTRAVENOUS EVERY 6 HOURS PRN
Status: DISCONTINUED | OUTPATIENT
Start: 2020-02-08 | End: 2020-02-09 | Stop reason: HOSPADM

## 2020-02-08 NOTE — ED PROVIDER NOTES
Patient Seen in: [facility] Emergency Department      History   Patient presents with:  Nausea/Vomiting/Diarrhea    Stated Complaint: vomiting     HPI    61-year-old female presents the ED with acute onset of abdominal pain.   States that she woke up palpation epigastric region the right upper quadrant diffusely negative McBurney's point tenderness no CVA tenderness on examination   Musculoskeletal:         General: No deformity. Skin:     General: Skin is warm and dry.       Capillary Refill: Capilla significant provement along with Dilaudid and Toradol with significant improvement of her symptoms. Labs reviewed LFTs grossly within normal limits lipase within normal limits. CT abdomen rule out nephrolithiasis.   CT abdomen with evidence of distended g

## 2020-02-08 NOTE — ED PROVIDER NOTES
Us Abdomen Complete (cpt=76700)    Result Date: 2/8/2020  PROCEDURE:  US ABDOMEN COMPLETE (CPT=76700)  COMPARISON:  [facility] , CT, CT ABDOMEN+PELVIS(CPT=74176), 2/08/2020, 5:58.   INDICATIONS:  vomiting  TECHNIQUE:  Real time gray-scale ultrasound was used to PATIENT STATED HISTORY: (As transcribed by Technologist)  Patient complains of back pain radiates to abdomen, nausea and vomiting today. FINDINGS:  LIVER:  No enlargement, atrophy, abnormal density, or significant focal lesion.   BILIARY:  There are hype gallbladder wall thickening. Findings may reflect acute cholecystitis. Correlate clinically. 2. There is common bile duct dilatation and there is some questionable high density material in the CBD raising suspicion for obstructing choledocholithiasis.  3.

## 2020-02-08 NOTE — ANESTHESIA PREPROCEDURE EVALUATION
PRE-OP EVALUATION    Patient Name: Chyna Ibarra    Pre-op Diagnosis: Acute cholecystitis [K81.0]    Procedure(s):  LAPAROSCOPIC CHOLECYSTECTOMY WITH CHOLANGIOGRAM POSS.  OPEN    Surgeon(s) and Role:     Bayron Giang MD - Primary    Pre-op vitals reviewe GI/Hepatic/Renal    Negative GI/hepatic/renal ROS. Cardiovascular      ECG reviewed.   Exercise tolerance: good     MET: >4                                           Endo/Other                           (+) arthritis       Pulmon II    TM distance: > 6 cm  Neck ROM: full Cardiovascular      Rhythm: regular  Rate: normal     Dental    No notable dental history. Pulmonary      Breath sounds clear to auscultation bilaterally.                Other findings            ASA: 2   Pl

## 2020-02-08 NOTE — CONSULTS
[facility]  Report of Consultation    Jo Arce Patient Status:  Observation    8/3/1960 MRN [id number]   [facility] 3NW-A Attending Krystian Lopez MD   Hosp Day # 0 PCP Nella Abbasi MD     Reason for Consultation:    Ab FOR PAIN MANAGEMENT   • LUMBAR EPIDURAL N/A 11/27/2017    Performed by Dwain Schaeffer MD at [address]   • SKIN SURGERY  3/7/17    MMS of [address] to left upper lip done by AB       Family History:    Family History   Problem Relation Age o Exam:    Blood pressure 125/73, pulse 73, temperature 97.9 °F (36.6 °C), temperature source Oral, resp. rate 18, height 67\", weight 170 lb (77.1 kg), last menstrual period 12/17/2013, SpO2 95 %, not currently breastfeeding.     GENERAL: well developed, wel measures 10.4 cm. AORTA/IVC:  Normal.      CONCLUSION:  Cholelithiasis and mild gallbladder wall thickening. No ductal stone or bile duct dilatation.     Dictated by: Ruthie Goncalves MD on 2/08/2020 at 7:57     Approved by: Ruthie Goncalves MD on 2/08/2020 at 8 or enlargement. AORTA/VASCULAR:    Unremarkable as seen on non-contrast imaging. RETROPERITONEUM:  No mass or adenopathy. BOWEL/MESENTERY:  No visible mass, obstruction, or bowel wall thickening.   There is mild colonic diverticulosis without evidence for of skin sensation     Lumbar radiculitis     Pain of right hip joint     Personal history of other malignant neoplasm of skin     Arthritis of right hip     Lumbar spondylosis     Foraminal stenosis of lumbar region     Screening for malignant neoplasm of

## 2020-02-08 NOTE — H&P
[facility] Hospitalist H&P       CC: Patient presents with:  Nausea/Vomiting/Diarrhea       PCP: Lala Rosario MD    History of Present Illness:  Ms. Kun Alfonso is a 62 yo female with PMH of ANTONETTE, lumbar spinal stenosis who presented to the ED with abdominal 3/7/17    MMS of BCC to left upper lip done by AB        ALL:    Penicillins             UNKNOWN    Comment:Other reaction(s): Unknown  Walnuts                 SWELLING     Home Medications:  omeprazole 20 MG Oral Capsule Delayed Release, Take 20 mg by jennifer 92.7   .0       Recent Labs   Lab 02/08/20 0426      K 3.8      CO2 25.0   BUN 24*   CREATSERUM 0.73   *   CA 9.9       Recent Labs   Lab 02/08/20 0426   ALT 34   AST 20   ALB 3.5       No results for input(s): TROP in the last [employer]) NRDR ([address]) which includes the Dose Index Registry. A preliminary radiology report was created by the [facility] radiology service. This report was sent to the emergency department.   The report was reviewed pr Degenerative disc disease noted especially at L4-5. There is narrowing of the spinal canal at L4-5. Degenerative endplate spurring is seen in the thoracic and lumbar spine. LUNG BASES:  Dependent atelectasis noted bilaterally. OTHER:  Negative.        CON patient while in house    [facility]  Internal Medicine  [facility]

## 2020-02-08 NOTE — ED NOTES
Nurse to Nurse report called to [facility]. Pt resting comfortably on cart, VSS in NAD.  at bedside. Will arrange transport when room is ready.

## 2020-02-09 ENCOUNTER — APPOINTMENT (OUTPATIENT)
Dept: GENERAL RADIOLOGY | Facility: HOSPITAL | Age: 60
End: 2020-02-09
Attending: SURGERY
Payer: COMMERCIAL

## 2020-02-09 ENCOUNTER — ANESTHESIA (OUTPATIENT)
Dept: SURGERY | Facility: HOSPITAL | Age: 60
End: 2020-02-09
Payer: COMMERCIAL

## 2020-02-09 LAB
ALBUMIN SERPL-MCNC: 2.6 G/DL (ref 3.4–5)
ALBUMIN/GLOB SERPL: 0.9 {RATIO} (ref 1–2)
ALP LIVER SERPL-CCNC: 64 U/L (ref 46–118)
ALT SERPL-CCNC: 44 U/L (ref 13–56)
ANION GAP SERPL CALC-SCNC: 4 MMOL/L (ref 0–18)
AST SERPL-CCNC: 24 U/L (ref 15–37)
BASOPHILS # BLD AUTO: 0.04 X10(3) UL (ref 0–0.2)
BASOPHILS NFR BLD AUTO: 0.6 %
BILIRUB SERPL-MCNC: 0.4 MG/DL (ref 0.1–2)
BUN BLD-MCNC: 15 MG/DL (ref 7–18)
BUN/CREAT SERPL: 25 (ref 10–20)
CALCIUM BLD-MCNC: 8.3 MG/DL (ref 8.5–10.1)
CHLORIDE SERPL-SCNC: 113 MMOL/L (ref 98–112)
CO2 SERPL-SCNC: 28 MMOL/L (ref 21–32)
CREAT BLD-MCNC: 0.6 MG/DL (ref 0.55–1.02)
DEPRECATED RDW RBC AUTO: 42.6 FL (ref 35.1–46.3)
EOSINOPHIL # BLD AUTO: 0.34 X10(3) UL (ref 0–0.7)
EOSINOPHIL NFR BLD AUTO: 5.4 %
ERYTHROCYTE [DISTWIDTH] IN BLOOD BY AUTOMATED COUNT: 12 % (ref 11–15)
GLOBULIN PLAS-MCNC: 2.9 G/DL (ref 2.8–4.4)
GLUCOSE BLD-MCNC: 97 MG/DL (ref 70–99)
HCT VFR BLD AUTO: 36.6 % (ref 35–48)
HGB BLD-MCNC: 11.7 G/DL (ref 12–16)
IMM GRANULOCYTES # BLD AUTO: 0.02 X10(3) UL (ref 0–1)
IMM GRANULOCYTES NFR BLD: 0.3 %
LYMPHOCYTES # BLD AUTO: 2.77 X10(3) UL (ref 1–4)
LYMPHOCYTES NFR BLD AUTO: 44.2 %
M PROTEIN MFR SERPL ELPH: 5.5 G/DL (ref 6.4–8.2)
MCH RBC QN AUTO: 30.8 PG (ref 26–34)
MCHC RBC AUTO-ENTMCNC: 32 G/DL (ref 31–37)
MCV RBC AUTO: 96.3 FL (ref 80–100)
MONOCYTES # BLD AUTO: 0.48 X10(3) UL (ref 0.1–1)
MONOCYTES NFR BLD AUTO: 7.7 %
NEUTROPHILS # BLD AUTO: 2.62 X10 (3) UL (ref 1.5–7.7)
NEUTROPHILS # BLD AUTO: 2.62 X10(3) UL (ref 1.5–7.7)
NEUTROPHILS NFR BLD AUTO: 41.8 %
OSMOLALITY SERPL CALC.SUM OF ELEC: 301 MOSM/KG (ref 275–295)
PLATELET # BLD AUTO: 230 10(3)UL (ref 150–450)
POTASSIUM SERPL-SCNC: 3.7 MMOL/L (ref 3.5–5.1)
RBC # BLD AUTO: 3.8 X10(6)UL (ref 3.8–5.3)
SODIUM SERPL-SCNC: 145 MMOL/L (ref 136–145)
WBC # BLD AUTO: 6.3 X10(3) UL (ref 4–11)

## 2020-02-09 PROCEDURE — 74300 X-RAY BILE DUCTS/PANCREAS: CPT | Performed by: SURGERY

## 2020-02-09 PROCEDURE — 80053 COMPREHEN METABOLIC PANEL: CPT | Performed by: INTERNAL MEDICINE

## 2020-02-09 PROCEDURE — 0FT44ZZ RESECTION OF GALLBLADDER, PERCUTANEOUS ENDOSCOPIC APPROACH: ICD-10-PCS | Performed by: SURGERY

## 2020-02-09 PROCEDURE — 85025 COMPLETE CBC W/AUTO DIFF WBC: CPT | Performed by: INTERNAL MEDICINE

## 2020-02-09 PROCEDURE — BF100ZZ FLUOROSCOPY OF BILE DUCTS USING HIGH OSMOLAR CONTRAST: ICD-10-PCS | Performed by: SURGERY

## 2020-02-09 PROCEDURE — 88304 TISSUE EXAM BY PATHOLOGIST: CPT | Performed by: SURGERY

## 2020-02-09 RX ORDER — HYDROMORPHONE HYDROCHLORIDE 1 MG/ML
1.2 INJECTION, SOLUTION INTRAMUSCULAR; INTRAVENOUS; SUBCUTANEOUS EVERY 2 HOUR PRN
Status: DISCONTINUED | OUTPATIENT
Start: 2020-02-09 | End: 2020-02-10

## 2020-02-09 RX ORDER — LABETALOL HYDROCHLORIDE 5 MG/ML
INJECTION, SOLUTION INTRAVENOUS AS NEEDED
Status: DISCONTINUED | OUTPATIENT
Start: 2020-02-09 | End: 2020-02-09 | Stop reason: SURG

## 2020-02-09 RX ORDER — HYDROCODONE BITARTRATE AND ACETAMINOPHEN 10; 325 MG/1; MG/1
1 TABLET ORAL AS NEEDED
Status: DISCONTINUED | OUTPATIENT
Start: 2020-02-09 | End: 2020-02-09 | Stop reason: HOSPADM

## 2020-02-09 RX ORDER — GLYCOPYRROLATE 0.2 MG/ML
INJECTION, SOLUTION INTRAMUSCULAR; INTRAVENOUS AS NEEDED
Status: DISCONTINUED | OUTPATIENT
Start: 2020-02-09 | End: 2020-02-09 | Stop reason: SURG

## 2020-02-09 RX ORDER — PANTOPRAZOLE SODIUM 20 MG/1
20 TABLET, DELAYED RELEASE ORAL
Status: DISCONTINUED | OUTPATIENT
Start: 2020-02-10 | End: 2020-02-10

## 2020-02-09 RX ORDER — POTASSIUM CHLORIDE 14.9 MG/ML
20 INJECTION INTRAVENOUS ONCE
Status: COMPLETED | OUTPATIENT
Start: 2020-02-09 | End: 2020-02-09

## 2020-02-09 RX ORDER — BUPIVACAINE HYDROCHLORIDE AND EPINEPHRINE 5; 5 MG/ML; UG/ML
INJECTION, SOLUTION EPIDURAL; INTRACAUDAL; PERINEURAL AS NEEDED
Status: DISCONTINUED | OUTPATIENT
Start: 2020-02-09 | End: 2020-02-09 | Stop reason: HOSPADM

## 2020-02-09 RX ORDER — METOCLOPRAMIDE HYDROCHLORIDE 5 MG/ML
10 INJECTION INTRAMUSCULAR; INTRAVENOUS AS NEEDED
Status: DISCONTINUED | OUTPATIENT
Start: 2020-02-09 | End: 2020-02-09 | Stop reason: HOSPADM

## 2020-02-09 RX ORDER — ROCURONIUM BROMIDE 10 MG/ML
INJECTION, SOLUTION INTRAVENOUS AS NEEDED
Status: DISCONTINUED | OUTPATIENT
Start: 2020-02-09 | End: 2020-02-09 | Stop reason: SURG

## 2020-02-09 RX ORDER — HYDROMORPHONE HYDROCHLORIDE 1 MG/ML
0.4 INJECTION, SOLUTION INTRAMUSCULAR; INTRAVENOUS; SUBCUTANEOUS EVERY 2 HOUR PRN
Status: DISCONTINUED | OUTPATIENT
Start: 2020-02-09 | End: 2020-02-10

## 2020-02-09 RX ORDER — HYDROCODONE BITARTRATE AND ACETAMINOPHEN 5; 325 MG/1; MG/1
1 TABLET ORAL EVERY 4 HOURS PRN
Status: DISCONTINUED | OUTPATIENT
Start: 2020-02-09 | End: 2020-02-10

## 2020-02-09 RX ORDER — ONDANSETRON 2 MG/ML
4 INJECTION INTRAMUSCULAR; INTRAVENOUS EVERY 6 HOURS PRN
Status: DISCONTINUED | OUTPATIENT
Start: 2020-02-09 | End: 2020-02-10

## 2020-02-09 RX ORDER — NEOSTIGMINE METHYLSULFATE 1 MG/ML
INJECTION INTRAVENOUS AS NEEDED
Status: DISCONTINUED | OUTPATIENT
Start: 2020-02-09 | End: 2020-02-09 | Stop reason: SURG

## 2020-02-09 RX ORDER — LIDOCAINE HYDROCHLORIDE 10 MG/ML
INJECTION, SOLUTION EPIDURAL; INFILTRATION; INTRACAUDAL; PERINEURAL AS NEEDED
Status: DISCONTINUED | OUTPATIENT
Start: 2020-02-09 | End: 2020-02-09 | Stop reason: SURG

## 2020-02-09 RX ORDER — NALOXONE HYDROCHLORIDE 0.4 MG/ML
80 INJECTION, SOLUTION INTRAMUSCULAR; INTRAVENOUS; SUBCUTANEOUS AS NEEDED
Status: DISCONTINUED | OUTPATIENT
Start: 2020-02-09 | End: 2020-02-09 | Stop reason: HOSPADM

## 2020-02-09 RX ORDER — DEXAMETHASONE SODIUM PHOSPHATE 4 MG/ML
VIAL (ML) INJECTION AS NEEDED
Status: DISCONTINUED | OUTPATIENT
Start: 2020-02-09 | End: 2020-02-09 | Stop reason: SURG

## 2020-02-09 RX ORDER — GENTAMICIN SULFATE 100 MG/100ML
INJECTION, SOLUTION INTRAVENOUS AS NEEDED
Status: DISCONTINUED | OUTPATIENT
Start: 2020-02-09 | End: 2020-02-09 | Stop reason: SURG

## 2020-02-09 RX ORDER — MEPERIDINE HYDROCHLORIDE 25 MG/ML
12.5 INJECTION INTRAMUSCULAR; INTRAVENOUS; SUBCUTANEOUS AS NEEDED
Status: COMPLETED | OUTPATIENT
Start: 2020-02-09 | End: 2020-02-09

## 2020-02-09 RX ORDER — CLINDAMYCIN PHOSPHATE 900 MG/50ML
900 INJECTION INTRAVENOUS ONCE
Status: COMPLETED | OUTPATIENT
Start: 2020-02-09 | End: 2020-02-09

## 2020-02-09 RX ORDER — MEPERIDINE HYDROCHLORIDE 25 MG/ML
INJECTION INTRAMUSCULAR; INTRAVENOUS; SUBCUTANEOUS
Status: COMPLETED
Start: 2020-02-09 | End: 2020-02-09

## 2020-02-09 RX ORDER — KETOROLAC TROMETHAMINE 30 MG/ML
30 INJECTION, SOLUTION INTRAMUSCULAR; INTRAVENOUS EVERY 6 HOURS PRN
Status: DISCONTINUED | OUTPATIENT
Start: 2020-02-09 | End: 2020-02-10

## 2020-02-09 RX ORDER — METOCLOPRAMIDE HYDROCHLORIDE 5 MG/ML
10 INJECTION INTRAMUSCULAR; INTRAVENOUS EVERY 6 HOURS PRN
Status: DISCONTINUED | OUTPATIENT
Start: 2020-02-09 | End: 2020-02-10

## 2020-02-09 RX ORDER — HYDROCODONE BITARTRATE AND ACETAMINOPHEN 5; 325 MG/1; MG/1
2 TABLET ORAL EVERY 4 HOURS PRN
Status: DISCONTINUED | OUTPATIENT
Start: 2020-02-09 | End: 2020-02-10

## 2020-02-09 RX ORDER — HYDROCODONE BITARTRATE AND ACETAMINOPHEN 10; 325 MG/1; MG/1
2 TABLET ORAL AS NEEDED
Status: DISCONTINUED | OUTPATIENT
Start: 2020-02-09 | End: 2020-02-09 | Stop reason: HOSPADM

## 2020-02-09 RX ORDER — SODIUM CHLORIDE, SODIUM LACTATE, POTASSIUM CHLORIDE, CALCIUM CHLORIDE 600; 310; 30; 20 MG/100ML; MG/100ML; MG/100ML; MG/100ML
INJECTION, SOLUTION INTRAVENOUS CONTINUOUS
Status: DISCONTINUED | OUTPATIENT
Start: 2020-02-09 | End: 2020-02-09 | Stop reason: HOSPADM

## 2020-02-09 RX ORDER — HYDROMORPHONE HYDROCHLORIDE 1 MG/ML
INJECTION, SOLUTION INTRAMUSCULAR; INTRAVENOUS; SUBCUTANEOUS
Status: COMPLETED
Start: 2020-02-09 | End: 2020-02-09

## 2020-02-09 RX ORDER — HYDROCODONE BITARTRATE AND ACETAMINOPHEN 5; 325 MG/1; MG/1
1-2 TABLET ORAL EVERY 6 HOURS PRN
Qty: 30 TABLET | Refills: 0 | Status: SHIPPED | OUTPATIENT
Start: 2020-02-09 | End: 2020-03-13 | Stop reason: ALTCHOICE

## 2020-02-09 RX ORDER — DEXTROSE, SODIUM CHLORIDE, AND POTASSIUM CHLORIDE 5; .45; .15 G/100ML; G/100ML; G/100ML
INJECTION INTRAVENOUS CONTINUOUS
Status: DISCONTINUED | OUTPATIENT
Start: 2020-02-09 | End: 2020-02-10

## 2020-02-09 RX ORDER — ONDANSETRON 2 MG/ML
4 INJECTION INTRAMUSCULAR; INTRAVENOUS AS NEEDED
Status: DISCONTINUED | OUTPATIENT
Start: 2020-02-09 | End: 2020-02-09 | Stop reason: HOSPADM

## 2020-02-09 RX ORDER — KETOROLAC TROMETHAMINE 15 MG/ML
15 INJECTION, SOLUTION INTRAMUSCULAR; INTRAVENOUS EVERY 6 HOURS PRN
Status: DISCONTINUED | OUTPATIENT
Start: 2020-02-09 | End: 2020-02-10

## 2020-02-09 RX ORDER — HYDROMORPHONE HYDROCHLORIDE 1 MG/ML
0.8 INJECTION, SOLUTION INTRAMUSCULAR; INTRAVENOUS; SUBCUTANEOUS EVERY 2 HOUR PRN
Status: DISCONTINUED | OUTPATIENT
Start: 2020-02-09 | End: 2020-02-10

## 2020-02-09 RX ORDER — HYDROMORPHONE HYDROCHLORIDE 1 MG/ML
0.4 INJECTION, SOLUTION INTRAMUSCULAR; INTRAVENOUS; SUBCUTANEOUS EVERY 5 MIN PRN
Status: DISCONTINUED | OUTPATIENT
Start: 2020-02-09 | End: 2020-02-09 | Stop reason: HOSPADM

## 2020-02-09 RX ADMIN — LABETALOL HYDROCHLORIDE 5 MG: 5 INJECTION, SOLUTION INTRAVENOUS at 08:29:00

## 2020-02-09 RX ADMIN — SODIUM CHLORIDE: 9 INJECTION, SOLUTION INTRAVENOUS at 09:16:00

## 2020-02-09 RX ADMIN — LIDOCAINE HYDROCHLORIDE 50 MG: 10 INJECTION, SOLUTION EPIDURAL; INFILTRATION; INTRACAUDAL; PERINEURAL at 08:05:00

## 2020-02-09 RX ADMIN — GLYCOPYRROLATE 0.6 MG: 0.2 INJECTION, SOLUTION INTRAMUSCULAR; INTRAVENOUS at 08:56:00

## 2020-02-09 RX ADMIN — DEXAMETHASONE SODIUM PHOSPHATE 4 MG: 4 MG/ML VIAL (ML) INJECTION at 08:28:00

## 2020-02-09 RX ADMIN — SODIUM CHLORIDE: 9 INJECTION, SOLUTION INTRAVENOUS at 09:07:00

## 2020-02-09 RX ADMIN — ROCURONIUM BROMIDE 40 MG: 10 INJECTION, SOLUTION INTRAVENOUS at 08:05:00

## 2020-02-09 RX ADMIN — NEOSTIGMINE METHYLSULFATE 3 MG: 1 INJECTION INTRAVENOUS at 08:56:00

## 2020-02-09 RX ADMIN — CLINDAMYCIN PHOSPHATE 900 MG: 900 INJECTION INTRAVENOUS at 08:15:00

## 2020-02-09 RX ADMIN — GENTAMICIN SULFATE 60 MG: 100 INJECTION, SOLUTION INTRAVENOUS at 08:19:00

## 2020-02-09 RX ADMIN — ONDANSETRON 4 MG: 2 INJECTION INTRAMUSCULAR; INTRAVENOUS at 08:28:00

## 2020-02-09 NOTE — PROGRESS NOTES
PREOP ANTIBIOTICS OF CLINDAMYCIN AND GENTAMYCIN ORDERED BUT NO ON FLOOR SO PHARMACY CALLED TO SENT BOTH ANTIBIOTICS TO SURGERY, PT SENT TO SURGERY AND ACCOMPANIED BY

## 2020-02-09 NOTE — RESPIRATORY THERAPY NOTE
ANTONETTE - Equipment Use Daily Summary:  · Set Mode   · Usage in hours:   · 90% Pressure (EPAP) level:   · 90% Insp Pressure (IPAP):   · AHI:   · Supplemental Oxygen:  · Comments: DID NOT USE

## 2020-02-09 NOTE — PROGRESS NOTES
RECEIVED FROM PACU PER BED, PT AWAKE AND ORIENTED, O2 ON AT 3 LITERS AND SATS >90, NO RESPIRATORY DIFFICULTY NOTED, HEART RATE STABLE, ABD SOFT, NO BOWEL SOUNDS NOTED, DENIES ANY NAUSEA, DENIES ANY BLADDER FULLNESS AT THIS TIME, LAP SITES X4 NOTED WITH NO

## 2020-02-09 NOTE — ANESTHESIA POSTPROCEDURE EVALUATION
103 [facility] Patient Status:  Observation   Age/Gender 63year old female MRN [id number]   Location 13[facility] [place] [facility] ER Attending Chris Johnston MD   Hosp Day # 0 PCP Sebastien Richard MD       Anesthesia Post

## 2020-02-09 NOTE — PROGRESS NOTES
[facility] Hospitalist Progress Note     [facility]      SUBJECTIVE:  Feeling well  Headache improved with Toradol  Minimal pain overnight, no nausea    OBJECTIVE:  Temp:  [97.5 °F (36.4 °C)-98 °F (36.7 °C)] 97.7 °F (36.5 °C)  Pulse:  [66-83] 66  Resp:  [1 significant masses. BILIARY:  Multiple gallstones within gallbladder lumen. Mild gallbladder wall thickening. No pericholecystic fluid. The sonographer notes a negative sonographic Lawrence's sign. No bile duct dilatation, the common duct measures 4 mm. 9 mm in diameter, dilated raising suspicion for possible obstructing choledocholithiasis. No intrahepatic ductal dilatation is appreciated. PANCREAS:  There is atrophy especially in the pancreatic tail. No ductal dilatation or mass seen.   SPLEEN:  No enl 2/08/2020 at 7:42     Approved by: Eleazar Orr MD on 2/08/2020 at 7:50             Meds:   No current outpatient medications on file.       0.9% NaCl infusion, , Intravenous, Continuous  Heparin Sodium (Porcine) 5000 UNIT/ML injection 5,000 Units, 5,

## 2020-02-09 NOTE — OPERATIVE REPORT
Report of Jose Daniel Davis 879 Patient Status:  Observation    8/3/1960 MRN [id number]   [facility] SURGERY Attending Avelino Loving MD   [facility] Day # 0 PCP Tiffanie Landin MD         2020    Gabe Arguello    PRE-OPER catheter was removed and the cystic duct was divided. The cystic artery and its branches were identified clipped and divided. The gallbladder was removed from the liver edge by taking down its peritoneal attachments with hook electrocautery.  Gallbladder wa

## 2020-02-09 NOTE — ANESTHESIA PROCEDURE NOTES
Airway  Date/Time: 2/9/2020 8:06 AM  Urgency: elective    Airway not difficult    General Information and Staff    Patient location during procedure: OR  Anesthesiologist: Rafael Terrazas MD  Performed: anesthesiologist     Indications and Patient Condi

## 2020-02-10 VITALS
RESPIRATION RATE: 18 BRPM | HEART RATE: 72 BPM | DIASTOLIC BLOOD PRESSURE: 66 MMHG | HEIGHT: 67 IN | SYSTOLIC BLOOD PRESSURE: 125 MMHG | WEIGHT: 170 LBS | BODY MASS INDEX: 26.68 KG/M2 | OXYGEN SATURATION: 97 % | TEMPERATURE: 98 F

## 2020-02-10 PROBLEM — Z99.89 OSA ON CPAP: Status: ACTIVE | Noted: 2020-02-10

## 2020-02-10 PROBLEM — G47.33 OSA ON CPAP: Status: ACTIVE | Noted: 2020-02-10

## 2020-02-10 LAB
ALBUMIN SERPL-MCNC: 2.7 G/DL (ref 3.4–5)
ALBUMIN/GLOB SERPL: 0.9 {RATIO} (ref 1–2)
ALP LIVER SERPL-CCNC: 68 U/L (ref 46–118)
ALT SERPL-CCNC: 105 U/L (ref 13–56)
ANION GAP SERPL CALC-SCNC: 8 MMOL/L (ref 0–18)
AST SERPL-CCNC: 62 U/L (ref 15–37)
ATRIAL RATE: 73 BPM
BASOPHILS # BLD AUTO: 0.02 X10(3) UL (ref 0–0.2)
BASOPHILS NFR BLD AUTO: 0.2 %
BILIRUB SERPL-MCNC: 0.3 MG/DL (ref 0.1–2)
BUN BLD-MCNC: 12 MG/DL (ref 7–18)
BUN/CREAT SERPL: 16.9 (ref 10–20)
CALCIUM BLD-MCNC: 7.8 MG/DL (ref 8.5–10.1)
CHLORIDE SERPL-SCNC: 108 MMOL/L (ref 98–112)
CO2 SERPL-SCNC: 26 MMOL/L (ref 21–32)
CREAT BLD-MCNC: 0.71 MG/DL (ref 0.55–1.02)
DEPRECATED RDW RBC AUTO: 43.3 FL (ref 35.1–46.3)
EOSINOPHIL # BLD AUTO: 0.09 X10(3) UL (ref 0–0.7)
EOSINOPHIL NFR BLD AUTO: 1 %
ERYTHROCYTE [DISTWIDTH] IN BLOOD BY AUTOMATED COUNT: 12.1 % (ref 11–15)
GLOBULIN PLAS-MCNC: 3.1 G/DL (ref 2.8–4.4)
GLUCOSE BLD-MCNC: 141 MG/DL (ref 70–99)
HCT VFR BLD AUTO: 34.6 % (ref 35–48)
HGB BLD-MCNC: 11 G/DL (ref 12–16)
IMM GRANULOCYTES # BLD AUTO: 0.07 X10(3) UL (ref 0–1)
IMM GRANULOCYTES NFR BLD: 0.8 %
LYMPHOCYTES # BLD AUTO: 2.31 X10(3) UL (ref 1–4)
LYMPHOCYTES NFR BLD AUTO: 24.8 %
M PROTEIN MFR SERPL ELPH: 5.8 G/DL (ref 6.4–8.2)
MCH RBC QN AUTO: 30.6 PG (ref 26–34)
MCHC RBC AUTO-ENTMCNC: 31.8 G/DL (ref 31–37)
MCV RBC AUTO: 96.4 FL (ref 80–100)
MONOCYTES # BLD AUTO: 0.64 X10(3) UL (ref 0.1–1)
MONOCYTES NFR BLD AUTO: 6.9 %
NEUTROPHILS # BLD AUTO: 6.18 X10 (3) UL (ref 1.5–7.7)
NEUTROPHILS # BLD AUTO: 6.18 X10(3) UL (ref 1.5–7.7)
NEUTROPHILS NFR BLD AUTO: 66.3 %
OSMOLALITY SERPL CALC.SUM OF ELEC: 296 MOSM/KG (ref 275–295)
P AXIS: 152 DEGREES
P-R INTERVAL: 150 MS
PLATELET # BLD AUTO: 218 10(3)UL (ref 150–450)
POTASSIUM SERPL-SCNC: 3.8 MMOL/L (ref 3.5–5.1)
Q-T INTERVAL: 398 MS
QRS DURATION: 84 MS
QTC CALCULATION (BEZET): 438 MS
R AXIS: 164 DEGREES
RBC # BLD AUTO: 3.59 X10(6)UL (ref 3.8–5.3)
SODIUM SERPL-SCNC: 142 MMOL/L (ref 136–145)
T AXIS: 168 DEGREES
VENTRICULAR RATE: 73 BPM
WBC # BLD AUTO: 9.3 X10(3) UL (ref 4–11)

## 2020-02-10 PROCEDURE — 85025 COMPLETE CBC W/AUTO DIFF WBC: CPT | Performed by: INTERNAL MEDICINE

## 2020-02-10 PROCEDURE — 80053 COMPREHEN METABOLIC PANEL: CPT | Performed by: INTERNAL MEDICINE

## 2020-02-10 RX ORDER — POTASSIUM CHLORIDE 20 MEQ/1
40 TABLET, EXTENDED RELEASE ORAL ONCE
Status: COMPLETED | OUTPATIENT
Start: 2020-02-10 | End: 2020-02-10

## 2020-02-10 NOTE — RESPIRATORY THERAPY NOTE
ANTONETTE : EQUIPMENT USE: DAILY SUMMARY                                            SET MODE:                                          USAGE IN HOURS:                                          90% EXP. PRESSURE(EPAP):

## 2020-02-10 NOTE — PROGRESS NOTES
[facility]  Progress Note    Mei Green Patient Status:  Observation    8/3/1960 MRN [id number]   [facility] 3NW-A Attending Matilda Shelton MD   Hosp Day # 0 PCP Alec Monet MD     Subjective:    Patient tolerating PO di diet, pain controlled    Plan:    Reviewed postop restrictions  Ok for dc home today  F/u in office in 1 week      Cheyanne Velasquez [address] Surgery  2/10/2020

## 2020-02-10 NOTE — DISCHARGE SUMMARY
General Medicine Discharge Summary     Patient ID:  Marisela Thomas  63year old  8/3/1960    Admit date: 2/8/2020    Discharge date and time: 2/10/20    Attending Physician: Eric Nagy MD     Primary Care Physician: Josselin Arroyo MD     Reason mouth every 6 (six) hours as needed for Pain.       CONTINUE these medications which have NOT CHANGED    omeprazole 20 MG Oral Capsule Delayed Release  Take 20 mg by mouth every morning before breakfast.    valACYclovir HCl (VALTREX) 1 G Oral Tab  Take 1 ta

## 2020-02-10 NOTE — PROGRESS NOTES
NURSING DISCHARGE NOTE    Discharged Home via Wheelchair. Accompanied by Spouse  Belongings Taken by patient/family. Discharged in stable condition.  Iv removed

## 2020-02-10 NOTE — PROGRESS NOTES
Pt is AOx4. Lungs are clear and unlabored on room air. Encouraged use of IS, pt practiced up to 1000.  2L overnight for ANTONETTE. Tele NSR. Refusing SCDs. q4 vitals. Saline locked, voiding well. Soft diet tolerated. Abdomen is soft;nondistended.  [place]

## 2020-02-10 NOTE — PLAN OF CARE
Problem: PAIN - ADULT  Goal: Verbalizes/displays adequate comfort level or patient's stated pain goal  Description  INTERVENTIONS:  - Encourage pt to monitor pain and request assistance  - Assess pain using appropriate pain scale  - Administer analgesics appropriate  - Identify discharge learning needs (meds, wound care, etc)  - Arrange for interpreters to assist at discharge as needed  - Consider post-discharge preferences of patient/family/discharge partner  - Complete POLST form as appropriate  - Assess adequate hydration with IV or PO as ordered and tolerated  - Evaluate effectiveness of GI medications  - Encourage mobilization and activity  - Obtain nutritional consult as needed  - Establish a toileting routine/schedule  - Consider collaborating with ph

## 2020-04-13 PROBLEM — M16.11 PRIMARY OSTEOARTHRITIS OF RIGHT HIP: Status: RESOLVED | Noted: 2019-01-10 | Resolved: 2020-04-13

## 2020-04-13 PROBLEM — M54.2 NECK PAIN: Status: RESOLVED | Noted: 2019-03-27 | Resolved: 2020-04-13

## 2020-04-13 PROBLEM — M16.11 ARTHRITIS OF RIGHT HIP: Status: RESOLVED | Noted: 2017-03-31 | Resolved: 2020-04-13

## 2020-06-20 ENCOUNTER — APPOINTMENT (OUTPATIENT)
Dept: GENERAL RADIOLOGY | Facility: HOSPITAL | Age: 60
End: 2020-06-20
Attending: EMERGENCY MEDICINE
Payer: COMMERCIAL

## 2020-06-20 ENCOUNTER — HOSPITAL ENCOUNTER (EMERGENCY)
Facility: HOSPITAL | Age: 60
Discharge: HOME | End: 2020-06-20
Attending: EMERGENCY MEDICINE
Payer: COMMERCIAL

## 2020-06-20 VITALS
HEART RATE: 86 BPM | BODY MASS INDEX: 28.13 KG/M2 | OXYGEN SATURATION: 98 % | RESPIRATION RATE: 16 BRPM | SYSTOLIC BLOOD PRESSURE: 119 MMHG | WEIGHT: 175 LBS | HEIGHT: 66 IN | DIASTOLIC BLOOD PRESSURE: 90 MMHG | TEMPERATURE: 99 F

## 2020-06-20 DIAGNOSIS — S99.191A CLOSED FRACTURE OF BASE OF FIFTH METATARSAL BONE OF RIGHT FOOT AT METAPHYSEAL-DIAPHYSEAL JUNCTION, INITIAL ENCOUNTER: Primary | ICD-10-CM

## 2020-06-20 PROCEDURE — 99284 EMERGENCY DEPT VISIT MOD MDM: CPT

## 2020-06-20 PROCEDURE — 73630 X-RAY EXAM OF FOOT: CPT | Performed by: EMERGENCY MEDICINE

## 2020-06-20 PROCEDURE — 73610 X-RAY EXAM OF ANKLE: CPT | Performed by: EMERGENCY MEDICINE

## 2020-06-20 RX ORDER — HYDROCODONE BITARTRATE AND ACETAMINOPHEN 5; 325 MG/1; MG/1
1-2 TABLET ORAL EVERY 6 HOURS PRN
Qty: 12 TABLET | Refills: 0 | Status: SHIPPED | OUTPATIENT
Start: 2020-06-20 | End: 2020-06-27

## 2020-06-20 NOTE — ED INITIAL ASSESSMENT (HPI)
Pt to ED post fall last night. Sts she tripped. Denies hitting head. C/o R ankle pain. +CMS.  Ice pack applied

## 2020-06-20 NOTE — ED PROVIDER NOTES
Patient Seen in: [facility] Emergency Department      History   Patient presents with:  Lower Extremity Injury    Stated Complaint: RT.ANKLE INJURY    HPI    58-year-old woman, here with complaint of right ankle pain states she slipped on a stair an History    Tobacco Use      Smoking status: Never Smoker      Smokeless tobacco: Never Used    Alcohol use: Yes      Comment: social    Drug use:  No             Review of Systems    Positive for stated complaint: RT.ANKLE INJURY  Other systems are as noted Dictated by: Seda Slater MD on 6/20/2020 at 9:03 AM     Finalized by: Seda Slater MD on 6/20/2020 at 9:03 AM          Xr Foot, Complete (min 3 Views), Right (cpt=73630)    Result Date: 6/20/2020  PROCEDURE:  XR FOOT, COMPLETE (MIN 3 VIEWS), R

## 2020-07-06 ENCOUNTER — APPOINTMENT (OUTPATIENT)
Dept: GENERAL RADIOLOGY | Facility: HOSPITAL | Age: 60
End: 2020-07-06
Attending: EMERGENCY MEDICINE
Payer: COMMERCIAL

## 2020-07-06 ENCOUNTER — HOSPITAL ENCOUNTER (EMERGENCY)
Facility: HOSPITAL | Age: 60
Discharge: HOME | End: 2020-07-06
Attending: EMERGENCY MEDICINE
Payer: COMMERCIAL

## 2020-07-06 VITALS
HEIGHT: 67 IN | DIASTOLIC BLOOD PRESSURE: 85 MMHG | OXYGEN SATURATION: 96 % | TEMPERATURE: 99 F | SYSTOLIC BLOOD PRESSURE: 126 MMHG | BODY MASS INDEX: 27.47 KG/M2 | HEART RATE: 79 BPM | RESPIRATION RATE: 16 BRPM | WEIGHT: 175 LBS
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
## 2020-07-06 DIAGNOSIS — M79.602 LEFT ARM PAIN: ICD-10-CM

## 2020-07-06 DIAGNOSIS — M25.422 EFFUSION OF LEFT ELBOW: Primary | ICD-10-CM

## 2020-07-06 PROCEDURE — 99284 EMERGENCY DEPT VISIT MOD MDM: CPT

## 2020-07-06 PROCEDURE — 73110 X-RAY EXAM OF WRIST: CPT | Performed by: EMERGENCY MEDICINE

## 2020-07-06 PROCEDURE — 73080 X-RAY EXAM OF ELBOW: CPT | Performed by: EMERGENCY MEDICINE

## 2020-07-06 RX ORDER — HYDROCODONE BITARTRATE AND ACETAMINOPHEN 5; 325 MG/1; MG/1
1-2 TABLET ORAL EVERY 6 HOURS PRN
Qty: 10 TABLET | Refills: 0 | Status: SHIPPED | OUTPATIENT
Start: 2020-07-06 | End: 2020-07-13

## 2020-07-06 NOTE — ED INITIAL ASSESSMENT (HPI)
PT fell off ortho scooter on Saturday. PT denies hitting head or LOC. PT denies blood thinner use. PT c/o L arm pain.

## 2020-07-06 NOTE — ED PROVIDER NOTES
Patient Seen in: [facility] Emergency Department      History   Patient presents with:  Upper Extremity Injury    Stated Complaint: left arm injury    HPI    CHIEF COMPLAINT: Left arm injury     HISTORY OF PRESENT ILLNESS: Patient is a 30-year-old f History:   Procedure Laterality Date   • COLONOSCOPY, POSSIBLE BIOPSY, POSSIBLE POLYPECTOMY 11741 N/A 3/10/2018    Performed by Toño García MD at [facility]   • HIP TOTAL REPLACEMENT Right 1/10/2019    Performed by Keon Espinzoa MD at  normal.  Psychiatric: calm and cooperative  Respiratory: CTAB  Cardiovascular: RRR, S1/S2, no m/c/r  Musculoskeletal: Extremities are symmetrical, full range of motion  Skin:  warm and dry, no rashes. Left arm: Elbow is tender to palpation diffusely.   Pa displaced fracture or dislocation. Normal mineralization. Unremarkable soft tissues.   CONCLUSION:  No evidence of acute displaced fracture or dislocation in the left wrist.   Dictated by: Ronaldo Leonardo MD on 7/06/2020 at 4:38 PM     Finalized by: Simon Kaur

## 2020-09-05 ENCOUNTER — HOSPITAL ENCOUNTER (INPATIENT)
Facility: HOSPITAL | Age: 60
LOS: 5 days | Discharge: HOME | DRG: 177 | End: 2020-09-10
Attending: EMERGENCY MEDICINE | Admitting: INTERNAL MEDICINE
Payer: COMMERCIAL

## 2020-09-05 ENCOUNTER — APPOINTMENT (OUTPATIENT)
Dept: CT IMAGING | Facility: HOSPITAL | Age: 60
DRG: 177 | End: 2020-09-05
Attending: EMERGENCY MEDICINE
Payer: COMMERCIAL

## 2020-09-05 DIAGNOSIS — J12.82 PNEUMONIA DUE TO COVID-19 VIRUS: Primary | ICD-10-CM

## 2020-09-05 DIAGNOSIS — R09.02 HYPOXIA: ICD-10-CM

## 2020-09-05 DIAGNOSIS — U07.1 PNEUMONIA DUE TO COVID-19 VIRUS: Primary | ICD-10-CM

## 2020-09-05 LAB
ALBUMIN SERPL-MCNC: 3.1 G/DL (ref 3.4–5)
ALBUMIN/GLOB SERPL: 0.7 {RATIO} (ref 1–2)
ALP LIVER SERPL-CCNC: 74 U/L (ref 46–118)
ALT SERPL-CCNC: 32 U/L (ref 13–56)
ANION GAP SERPL CALC-SCNC: 6 MMOL/L (ref 0–18)
AST SERPL-CCNC: 23 U/L (ref 15–37)
BASOPHILS # BLD AUTO: 0.01 X10(3) UL (ref 0–0.2)
BASOPHILS NFR BLD AUTO: 0.1 %
BILIRUB SERPL-MCNC: 0.4 MG/DL (ref 0.1–2)
BILIRUB UR QL STRIP.AUTO: NEGATIVE
BUN BLD-MCNC: 16 MG/DL (ref 7–18)
BUN/CREAT SERPL: 21.1 (ref 10–20)
CALCIUM BLD-MCNC: 9 MG/DL (ref 8.5–10.1)
CHLORIDE SERPL-SCNC: 105 MMOL/L (ref 98–112)
CK SERPL-CCNC: 45 U/L (ref 26–192)
CLARITY UR REFRACT.AUTO: CLEAR
CO2 SERPL-SCNC: 25 MMOL/L (ref 21–32)
COLOR UR AUTO: YELLOW
CREAT BLD-MCNC: 0.76 MG/DL (ref 0.55–1.02)
CRP SERPL-MCNC: 3.59 MG/DL (ref ?–0.3)
D-DIMER: 1.12 UG/ML FEU (ref ?–0.59)
DEPRECATED HBV CORE AB SER IA-ACNC: 218.2 NG/ML (ref 18–340)
DEPRECATED RDW RBC AUTO: 38 FL (ref 35.1–46.3)
EOSINOPHIL # BLD AUTO: 0 X10(3) UL (ref 0–0.7)
EOSINOPHIL NFR BLD AUTO: 0 %
ERYTHROCYTE [DISTWIDTH] IN BLOOD BY AUTOMATED COUNT: 11.8 % (ref 11–15)
GLOBULIN PLAS-MCNC: 4.4 G/DL (ref 2.8–4.4)
GLUCOSE BLD-MCNC: 126 MG/DL (ref 70–99)
GLUCOSE UR STRIP.AUTO-MCNC: NEGATIVE MG/DL
HCT VFR BLD AUTO: 41 % (ref 35–48)
HGB BLD-MCNC: 13.6 G/DL (ref 12–16)
IMM GRANULOCYTES # BLD AUTO: 0.03 X10(3) UL (ref 0–1)
IMM GRANULOCYTES NFR BLD: 0.3 %
KETONES UR STRIP.AUTO-MCNC: NEGATIVE MG/DL
LACTATE SERPL-SCNC: 1.3 MMOL/L (ref 0.4–2)
LDH SERPL L TO P-CCNC: 278 U/L
LEUKOCYTE ESTERASE UR QL STRIP.AUTO: NEGATIVE
LYMPHOCYTES # BLD AUTO: 0.83 X10(3) UL (ref 1–4)
LYMPHOCYTES NFR BLD AUTO: 9.1 %
M PROTEIN MFR SERPL ELPH: 7.5 G/DL (ref 6.4–8.2)
MCH RBC QN AUTO: 29.7 PG (ref 26–34)
MCHC RBC AUTO-ENTMCNC: 33.2 G/DL (ref 31–37)
MCV RBC AUTO: 89.5 FL (ref 80–100)
MONOCYTES # BLD AUTO: 0.36 X10(3) UL (ref 0.1–1)
MONOCYTES NFR BLD AUTO: 3.9 %
NEUTROPHILS # BLD AUTO: 7.9 X10 (3) UL (ref 1.5–7.7)
NEUTROPHILS # BLD AUTO: 7.9 X10(3) UL (ref 1.5–7.7)
NEUTROPHILS NFR BLD AUTO: 86.6 %
NITRITE UR QL STRIP.AUTO: NEGATIVE
NT-PROBNP SERPL-MCNC: 82 PG/ML (ref ?–125)
OSMOLALITY SERPL CALC.SUM OF ELEC: 285 MOSM/KG (ref 275–295)
PH UR STRIP.AUTO: 7 [PH] (ref 4.5–8)
PLATELET # BLD AUTO: 323 10(3)UL (ref 150–450)
POTASSIUM SERPL-SCNC: 3.3 MMOL/L (ref 3.5–5.1)
PROCALCITONIN SERPL-MCNC: <0.05 NG/ML (ref ?–0.16)
PROT UR STRIP.AUTO-MCNC: NEGATIVE MG/DL
RBC # BLD AUTO: 4.58 X10(6)UL (ref 3.8–5.3)
RBC UR QL AUTO: NEGATIVE
SODIUM SERPL-SCNC: 136 MMOL/L (ref 136–145)
SP GR UR STRIP.AUTO: 1.01 (ref 1–1.03)
TROPONIN I SERPL-MCNC: <0.045 NG/ML (ref ?–0.04)
TROPONIN I SERPL-MCNC: <0.045 NG/ML (ref ?–0.04)
UROBILINOGEN UR STRIP.AUTO-MCNC: 0.2 MG/DL
WBC # BLD AUTO: 9.1 X10(3) UL (ref 4–11)

## 2020-09-05 PROCEDURE — 85379 FIBRIN DEGRADATION QUANT: CPT | Performed by: EMERGENCY MEDICINE

## 2020-09-05 PROCEDURE — 85025 COMPLETE CBC W/AUTO DIFF WBC: CPT | Performed by: EMERGENCY MEDICINE

## 2020-09-05 PROCEDURE — 93005 ELECTROCARDIOGRAM TRACING: CPT

## 2020-09-05 PROCEDURE — 36415 COLL VENOUS BLD VENIPUNCTURE: CPT

## 2020-09-05 PROCEDURE — 82550 ASSAY OF CK (CPK): CPT | Performed by: EMERGENCY MEDICINE

## 2020-09-05 PROCEDURE — 87040 BLOOD CULTURE FOR BACTERIA: CPT | Performed by: EMERGENCY MEDICINE

## 2020-09-05 PROCEDURE — 80053 COMPREHEN METABOLIC PANEL: CPT | Performed by: EMERGENCY MEDICINE

## 2020-09-05 PROCEDURE — 71260 CT THORAX DX C+: CPT | Performed by: EMERGENCY MEDICINE

## 2020-09-05 PROCEDURE — 84145 PROCALCITONIN (PCT): CPT | Performed by: EMERGENCY MEDICINE

## 2020-09-05 PROCEDURE — 83605 ASSAY OF LACTIC ACID: CPT | Performed by: EMERGENCY MEDICINE

## 2020-09-05 PROCEDURE — 84484 ASSAY OF TROPONIN QUANT: CPT | Performed by: EMERGENCY MEDICINE

## 2020-09-05 PROCEDURE — 86140 C-REACTIVE PROTEIN: CPT | Performed by: EMERGENCY MEDICINE

## 2020-09-05 PROCEDURE — 93010 ELECTROCARDIOGRAM REPORT: CPT | Performed by: INTERNAL MEDICINE

## 2020-09-05 PROCEDURE — 83615 LACTATE (LD) (LDH) ENZYME: CPT | Performed by: EMERGENCY MEDICINE

## 2020-09-05 PROCEDURE — 83880 ASSAY OF NATRIURETIC PEPTIDE: CPT | Performed by: EMERGENCY MEDICINE

## 2020-09-05 PROCEDURE — 99285 EMERGENCY DEPT VISIT HI MDM: CPT

## 2020-09-05 PROCEDURE — 81003 URINALYSIS AUTO W/O SCOPE: CPT | Performed by: EMERGENCY MEDICINE

## 2020-09-05 PROCEDURE — 82728 ASSAY OF FERRITIN: CPT | Performed by: EMERGENCY MEDICINE

## 2020-09-05 RX ORDER — ENOXAPARIN SODIUM 100 MG/ML
40 INJECTION SUBCUTANEOUS DAILY
Status: DISCONTINUED | OUTPATIENT
Start: 2020-09-05 | End: 2020-09-10

## 2020-09-05 RX ORDER — POTASSIUM CHLORIDE 20 MEQ/1
40 TABLET, EXTENDED RELEASE ORAL ONCE
Status: COMPLETED | OUTPATIENT
Start: 2020-09-05 | End: 2020-09-05

## 2020-09-05 RX ORDER — ACETAMINOPHEN 500 MG
1000 TABLET ORAL ONCE
Status: COMPLETED | OUTPATIENT
Start: 2020-09-05 | End: 2020-09-05

## 2020-09-05 RX ORDER — ZOLPIDEM TARTRATE 5 MG/1
5 TABLET ORAL NIGHTLY PRN
Status: DISCONTINUED | OUTPATIENT
Start: 2020-09-05 | End: 2020-09-10

## 2020-09-05 RX ORDER — BENZONATATE 100 MG/1
100 CAPSULE ORAL 3 TIMES DAILY PRN
Status: DISCONTINUED | OUTPATIENT
Start: 2020-09-05 | End: 2020-09-10

## 2020-09-05 RX ORDER — ACETAMINOPHEN 325 MG/1
650 TABLET ORAL EVERY 6 HOURS PRN
Status: DISCONTINUED | OUTPATIENT
Start: 2020-09-05 | End: 2020-09-10

## 2020-09-05 RX ORDER — PANTOPRAZOLE SODIUM 40 MG/1
40 TABLET, DELAYED RELEASE ORAL
Status: DISCONTINUED | OUTPATIENT
Start: 2020-09-06 | End: 2020-09-10

## 2020-09-05 NOTE — H&P
[facility] Hospitalist Team  History and Physical       Assessment/Plan:     #Covid - 19 infection  -monitor dimer, CT chest pending  -consult ID, abx per ID  -monitor inflammatory labs  -minimize ivf  -o2 as needed on 3L currently  -decadron     #h/o Major depre Skye Gamboa MD at [address] [place] [address] N/A 12/5/2018    Performed by Leann Martinez MD at [address] [place] [address] N/A 1/30/2018    Performed by Leann Martinez MD at [address] [place] [address] procedure. Take 1 tab immediately prior to procedure if needed. Cholecalciferol 125 MCG (5000 UT) Oral Tab, Take 1 tablet by mouth daily. B Complex Vitamins (VITAMIN B COMPLEX) Oral Tab, Take 1 tablet by mouth daily.   TURMERIC OR, Take 1 tablet by mouth Neck: Supple, symmetrical, trachea midline   Lungs:   Clear to auscultation bilaterally.  Normal effort   Chest wall:  No tenderness or deformity   Heart:  Regular rate and rhythm, S1, S2 normal, no murmur, rub or gallop appreciated   Abdomen:   Soft, non total of 70 minutes taken with patient and coordinating care. Greater than 50% face to face encounter.

## 2020-09-05 NOTE — ED PROVIDER NOTES
Patient Seen in: [facility] Emergency Department      History   Patient presents with:  Dyspnea KAREN SOB  Abnormal Result    Stated Complaint: + COVID, SOB    HPI    60-year-old female who was diagnosed positive with COVID and pneumonia secondary to Performed by Marisol Carmona MD at [address] [place] [address] N/A 12/15/2017    Performed by Marisol Carmona MD at [address] [place] [address] N/A 11/27/2017    Performed by Marisol Carmona MD at [address] [place] [address] All other components within normal limits   C-REACTIVE PROTEIN - Abnormal; Notable for the following components:    C-Reactive Protein 3.59 (*)     All other components within normal limits   D-DIMER - Abnormal; Notable for the following components:    D-D pneumonia. No sizable pleural effusion or pneumothorax is identified. IMPRESSION: Multifocal peripheral airspace disease compatible with pneumonia. Distribution can be seen with Covid-19 pneumonia.     Ct Chest Pe Aorta (iv Only) (cpt=71260)    Result Da Patient was observed in the department. Her potassium is replaced. Patient has inflammatory markers that are positive but no signs of PE on her initial CTA. Patient was given supportive care with improvement in her oxygen saturation levels.   Patient

## 2020-09-06 LAB
BASOPHILS # BLD AUTO: 0.01 X10(3) UL (ref 0–0.2)
BASOPHILS NFR BLD AUTO: 0.2 %
CRP SERPL-MCNC: 2.9 MG/DL (ref ?–0.3)
D-DIMER: 0.59 UG/ML FEU (ref ?–0.59)
DEPRECATED HBV CORE AB SER IA-ACNC: 194.6 NG/ML (ref 18–340)
DEPRECATED RDW RBC AUTO: 39.2 FL (ref 35.1–46.3)
EOSINOPHIL # BLD AUTO: 0 X10(3) UL (ref 0–0.7)
EOSINOPHIL NFR BLD AUTO: 0 %
ERYTHROCYTE [DISTWIDTH] IN BLOOD BY AUTOMATED COUNT: 11.9 % (ref 11–15)
HCT VFR BLD AUTO: 38.4 % (ref 35–48)
HGB BLD-MCNC: 12.6 G/DL (ref 12–16)
IMM GRANULOCYTES # BLD AUTO: 0.03 X10(3) UL (ref 0–1)
IMM GRANULOCYTES NFR BLD: 0.6 %
LDH SERPL L TO P-CCNC: 245 U/L
LYMPHOCYTES # BLD AUTO: 0.64 X10(3) UL (ref 1–4)
LYMPHOCYTES NFR BLD AUTO: 12.4 %
MCH RBC QN AUTO: 29.6 PG (ref 26–34)
MCHC RBC AUTO-ENTMCNC: 32.8 G/DL (ref 31–37)
MCV RBC AUTO: 90.4 FL (ref 80–100)
MONOCYTES # BLD AUTO: 0.2 X10(3) UL (ref 0.1–1)
MONOCYTES NFR BLD AUTO: 3.9 %
NEUTROPHILS # BLD AUTO: 4.27 X10 (3) UL (ref 1.5–7.7)
NEUTROPHILS # BLD AUTO: 4.27 X10(3) UL (ref 1.5–7.7)
NEUTROPHILS NFR BLD AUTO: 82.9 %
PLATELET # BLD AUTO: 303 10(3)UL (ref 150–450)
RBC # BLD AUTO: 4.25 X10(6)UL (ref 3.8–5.3)
WBC # BLD AUTO: 5.2 X10(3) UL (ref 4–11)

## 2020-09-06 PROCEDURE — 82728 ASSAY OF FERRITIN: CPT | Performed by: INTERNAL MEDICINE

## 2020-09-06 PROCEDURE — 83615 LACTATE (LD) (LDH) ENZYME: CPT | Performed by: INTERNAL MEDICINE

## 2020-09-06 PROCEDURE — 86140 C-REACTIVE PROTEIN: CPT | Performed by: INTERNAL MEDICINE

## 2020-09-06 PROCEDURE — 85379 FIBRIN DEGRADATION QUANT: CPT | Performed by: INTERNAL MEDICINE

## 2020-09-06 PROCEDURE — 85025 COMPLETE CBC W/AUTO DIFF WBC: CPT | Performed by: INTERNAL MEDICINE

## 2020-09-06 NOTE — CONSULTS
INFECTIOUS DISEASE [facility] Patient Status:  Inpatient    8/3/1960 MRN [id number]   [facility] 5NW-A Attending Nino Ching, [facility] Day # 1 PCP Katie Cohen 1/30/2018    Performed by Ramy Wagner MD at [address] [place] [address] N/A 12/15/2017    Performed by Ramy Wagner MD at [address] [place] [address] N/A 11/27/2017    Performed by Ramy Wagner MD at for cough. , Disp: 21 capsule, Rfl: 0  Albuterol Sulfate  (90 Base) MCG/ACT Inhalation Aero Soln, Inhale 2 puffs into the lungs every 4 (four) hours as needed for Wheezing., Disp: 1 Inhaler, Rfl: 0  Omeprazole 40 MG Oral Capsule Delayed Release, Take    CO2 25.0   ALKPHO 74   AST 23   ALT 32   BILT 0.4   TP 7.5     COVID-19 Lab Results    COVID-19  Lab Results   Component Value Date    COVID19 POSITIVE (A) 09/04/2020    COVID19 Not Detected 05/10/2020       Pro-Calcitonin  Recent Labs   Lab 09/

## 2020-09-06 NOTE — PLAN OF CARE
Problem: RESPIRATORY - ADULT  Goal: Interventions of Goals  Outcome: Progressing  Goal: Achieves optimal ventilation and oxygenation  Description  INTERVENTIONS:  - Assess for changes in respiratory status  - Assess for changes in mentation and behavior

## 2020-09-06 NOTE — PROGRESS NOTES
NURSING ADMISSION NOTE      Patient admitted via Cart  Oriented to room. 514  Safety precautions initiated. Bed in low position. Call light in reach. Patient admitted to room, A/Ox4. No complaints of pain at this time.  Patient is on RA at this

## 2020-09-06 NOTE — PLAN OF CARE
Problem: Patient/Family Goals  Goal: Patient/Family Long Term Goal  Description  Patient's Long Term Goal: feel better    Interventions:  - po decadron  - prn tessalon  - See additional Care Plan goals for specific interventions   Outcome: Progressing  G

## 2020-09-06 NOTE — PROGRESS NOTES
[facility] Hospitalist Team  Progress Note      Kaz Webber  8/3/1960    Assessment/Plan:     #Covid - 19 infection  -monitor dimer, CT chest no PE  -consult ID, abx per ID  -monitor inflammatory labs  -minimize ivf  -o2 as needed on 3L currently  -decadron

## 2020-09-07 LAB
ATRIAL RATE: 94 BPM
BASOPHILS # BLD AUTO: 0.01 X10(3) UL (ref 0–0.2)
BASOPHILS NFR BLD AUTO: 0.1 %
CRP SERPL-MCNC: 1.65 MG/DL (ref ?–0.3)
D-DIMER: 0.69 UG/ML FEU (ref ?–0.59)
DEPRECATED HBV CORE AB SER IA-ACNC: 167.6 NG/ML (ref 18–340)
DEPRECATED RDW RBC AUTO: 41.5 FL (ref 35.1–46.3)
EOSINOPHIL # BLD AUTO: 0 X10(3) UL (ref 0–0.7)
EOSINOPHIL NFR BLD AUTO: 0 %
ERYTHROCYTE [DISTWIDTH] IN BLOOD BY AUTOMATED COUNT: 11.9 % (ref 11–15)
HCT VFR BLD AUTO: 39.1 % (ref 35–48)
HGB BLD-MCNC: 12.2 G/DL (ref 12–16)
IMM GRANULOCYTES # BLD AUTO: 0.05 X10(3) UL (ref 0–1)
IMM GRANULOCYTES NFR BLD: 0.6 %
LDH SERPL L TO P-CCNC: 215 U/L
LYMPHOCYTES # BLD AUTO: 1.13 X10(3) UL (ref 1–4)
LYMPHOCYTES NFR BLD AUTO: 13.5 %
MCH RBC QN AUTO: 29.5 PG (ref 26–34)
MCHC RBC AUTO-ENTMCNC: 31.2 G/DL (ref 31–37)
MCV RBC AUTO: 94.7 FL (ref 80–100)
MONOCYTES # BLD AUTO: 0.41 X10(3) UL (ref 0.1–1)
MONOCYTES NFR BLD AUTO: 4.9 %
NEUTROPHILS # BLD AUTO: 6.78 X10 (3) UL (ref 1.5–7.7)
NEUTROPHILS # BLD AUTO: 6.78 X10(3) UL (ref 1.5–7.7)
NEUTROPHILS NFR BLD AUTO: 80.9 %
P AXIS: 48 DEGREES
P-R INTERVAL: 166 MS
PLATELET # BLD AUTO: 343 10(3)UL (ref 150–450)
Q-T INTERVAL: 344 MS
QRS DURATION: 74 MS
QTC CALCULATION (BEZET): 430 MS
R AXIS: 37 DEGREES
RBC # BLD AUTO: 4.13 X10(6)UL (ref 3.8–5.3)
T AXIS: 54 DEGREES
VENTRICULAR RATE: 94 BPM
WBC # BLD AUTO: 8.4 X10(3) UL (ref 4–11)

## 2020-09-07 PROCEDURE — 85025 COMPLETE CBC W/AUTO DIFF WBC: CPT | Performed by: INTERNAL MEDICINE

## 2020-09-07 PROCEDURE — 82728 ASSAY OF FERRITIN: CPT | Performed by: INTERNAL MEDICINE

## 2020-09-07 PROCEDURE — 85379 FIBRIN DEGRADATION QUANT: CPT | Performed by: INTERNAL MEDICINE

## 2020-09-07 PROCEDURE — 86140 C-REACTIVE PROTEIN: CPT | Performed by: INTERNAL MEDICINE

## 2020-09-07 PROCEDURE — 83615 LACTATE (LD) (LDH) ENZYME: CPT | Performed by: INTERNAL MEDICINE

## 2020-09-07 NOTE — PROGRESS NOTES
[facility] Hospitalist Team  Progress Note      Brenda Gardunoe  8/3/1960    Assessment/Plan:     #Covid - 19 infection  -monitor dimer, CT chest no PE  -consult ID, abx per ID  -monitor inflammatory labs  -minimize ivf  -o2 prn, on RA today  -decadron ordered

## 2020-09-07 NOTE — PROGRESS NOTES
[facility]                INFECTIOUS DISEASE PROGRESS NOTE    [facility] Patient Status:  Inpatient    8/3/1960 MRN [id number]   [facility] 5NW-A Attending Davy Stein, [address] [facility] Day # 2 PCP Tiffanie Landin MD     [place] Collection Time: 09/05/20  1:23 PM   Result Value Ref Range    Blood Culture Result No Growth 1 Day N/A         Problem list reviewed:  Patient Active Problem List:     Degeneration of cervical intervertebral disc     Cervicalgia     Migraine, unspecified,

## 2020-09-08 ENCOUNTER — APPOINTMENT (OUTPATIENT)
Dept: CV DIAGNOSTICS | Facility: HOSPITAL | Age: 60
DRG: 177 | End: 2020-09-08
Attending: HOSPITALIST
Payer: COMMERCIAL

## 2020-09-08 LAB
ANION GAP SERPL CALC-SCNC: 6 MMOL/L (ref 0–18)
BASOPHILS # BLD AUTO: 0.01 X10(3) UL (ref 0–0.2)
BASOPHILS NFR BLD AUTO: 0.2 %
BUN BLD-MCNC: 14 MG/DL (ref 7–18)
BUN/CREAT SERPL: 25.9 (ref 10–20)
CALCIUM BLD-MCNC: 9.3 MG/DL (ref 8.5–10.1)
CHLORIDE SERPL-SCNC: 108 MMOL/L (ref 98–112)
CO2 SERPL-SCNC: 26 MMOL/L (ref 21–32)
CREAT BLD-MCNC: 0.54 MG/DL (ref 0.55–1.02)
CRP SERPL-MCNC: 1 MG/DL (ref ?–0.3)
D-DIMER: 0.89 UG/ML FEU (ref ?–0.59)
DEPRECATED HBV CORE AB SER IA-ACNC: 172.6 NG/ML (ref 18–340)
DEPRECATED RDW RBC AUTO: 41 FL (ref 35.1–46.3)
EOSINOPHIL # BLD AUTO: 0 X10(3) UL (ref 0–0.7)
EOSINOPHIL NFR BLD AUTO: 0 %
ERYTHROCYTE [DISTWIDTH] IN BLOOD BY AUTOMATED COUNT: 11.9 % (ref 11–15)
GLUCOSE BLD-MCNC: 114 MG/DL (ref 70–99)
HCT VFR BLD AUTO: 41.6 % (ref 35–48)
HGB BLD-MCNC: 12.9 G/DL (ref 12–16)
IMM GRANULOCYTES # BLD AUTO: 0.06 X10(3) UL (ref 0–1)
IMM GRANULOCYTES NFR BLD: 1 %
LDH SERPL L TO P-CCNC: 215 U/L
LYMPHOCYTES # BLD AUTO: 1.75 X10(3) UL (ref 1–4)
LYMPHOCYTES NFR BLD AUTO: 27.9 %
MCH RBC QN AUTO: 29 PG (ref 26–34)
MCHC RBC AUTO-ENTMCNC: 31 G/DL (ref 31–37)
MCV RBC AUTO: 93.5 FL (ref 80–100)
MONOCYTES # BLD AUTO: 0.48 X10(3) UL (ref 0.1–1)
MONOCYTES NFR BLD AUTO: 7.6 %
NEUTROPHILS # BLD AUTO: 3.98 X10 (3) UL (ref 1.5–7.7)
NEUTROPHILS # BLD AUTO: 3.98 X10(3) UL (ref 1.5–7.7)
NEUTROPHILS NFR BLD AUTO: 63.3 %
NT-PROBNP SERPL-MCNC: 146 PG/ML (ref ?–125)
OSMOLALITY SERPL CALC.SUM OF ELEC: 291 MOSM/KG (ref 275–295)
PLATELET # BLD AUTO: 360 10(3)UL (ref 150–450)
POTASSIUM SERPL-SCNC: 3.8 MMOL/L (ref 3.5–5.1)
RBC # BLD AUTO: 4.45 X10(6)UL (ref 3.8–5.3)
SODIUM SERPL-SCNC: 140 MMOL/L (ref 136–145)
WBC # BLD AUTO: 6.3 X10(3) UL (ref 4–11)

## 2020-09-08 PROCEDURE — 93306 TTE W/DOPPLER COMPLETE: CPT | Performed by: HOSPITALIST

## 2020-09-08 PROCEDURE — 82728 ASSAY OF FERRITIN: CPT | Performed by: INTERNAL MEDICINE

## 2020-09-08 PROCEDURE — 86140 C-REACTIVE PROTEIN: CPT | Performed by: INTERNAL MEDICINE

## 2020-09-08 PROCEDURE — 83615 LACTATE (LD) (LDH) ENZYME: CPT | Performed by: INTERNAL MEDICINE

## 2020-09-08 PROCEDURE — 83880 ASSAY OF NATRIURETIC PEPTIDE: CPT | Performed by: HOSPITALIST

## 2020-09-08 PROCEDURE — 80048 BASIC METABOLIC PNL TOTAL CA: CPT | Performed by: HOSPITALIST

## 2020-09-08 PROCEDURE — 85025 COMPLETE CBC W/AUTO DIFF WBC: CPT | Performed by: INTERNAL MEDICINE

## 2020-09-08 PROCEDURE — 85379 FIBRIN DEGRADATION QUANT: CPT | Performed by: INTERNAL MEDICINE

## 2020-09-08 RX ORDER — BENZONATATE 200 MG/1
200 CAPSULE ORAL 3 TIMES DAILY PRN
Status: DISCONTINUED | OUTPATIENT
Start: 2020-09-08 | End: 2020-09-10

## 2020-09-08 RX ORDER — ALBUTEROL SULFATE 90 UG/1
2 AEROSOL, METERED RESPIRATORY (INHALATION) EVERY 4 HOURS PRN
Status: DISCONTINUED | OUTPATIENT
Start: 2020-09-08 | End: 2020-09-10

## 2020-09-08 NOTE — PAYOR COMM NOTE
--------------  ADMISSION REVIEW     Payor: Danae Elizabeth #:  [id number]  Authorization Number: [id number]    Admit date: 9/5/20  Admit time: 1804       Admitting Physician: Kenia Hamm MD  Attending Physician:  Joey Romero DO Performed by Dulce Maria Delarosa MD at [address] [place] [address]   • HIP TOTAL REPLACEMENT Right 1/10/2019    Performed by Zahra Rosales MD at [facility] MAIN OR   • [address] N/A 2/9/2020    Performed by Marybeth Gordon MD at [facility] MAIN OR   • LUMBAR EP focal deficits noted    ED Course     Labs Reviewed   COMP METABOLIC PANEL (14) - Abnormal; Notable for the following components:       Result Value    Glucose 126 (*)     Potassium 3.3 (*)     BUN/CREA Ratio 21.1 (*)     Albumin 3.1 (*)     A/G Ratio 0.7 Distribution can be seen with Covid-19 pneumonia. Ct Chest Pe Aorta (iv Only) (cpt=71260)  Result Date: 9/5/2020  PROCEDURE:  CT CHEST PE AORTA (IV ONLY) (CPT=71260)  COMPARISON:  None.   INDICATIONS:  + COVID, SOB  TECHNIQUE:  CT images were obtained wi at this time. She is feeling markedly better while here. Admission disposition: 9/5/2020  5:47 PM    Critical Care Note:  The patient arrived with a condition with significant morbidity and mortality associated.  The services I provided  were to promote i denies sick contacts. Chest xr done yesterday shows multifocal PNA.      ALL:  Penicillins             UNKNOWN    Comment:Other reaction(s): Unknown  Walnuts                 SWELLING     Review of Systems    CONSTITUTIONAL:  As per HPI (-) weight loss (-) f HGB 13.6 09/05/2020    HCT 41.0 09/05/2020    .0 09/05/2020    CREATSERUM 0.76 09/05/2020    BUN 16 09/05/2020     09/05/2020    K 3.3 09/05/2020     09/05/2020    CO2 25.0 09/05/2020     09/05/2020    CA 9.0 09/05/2020    ALB 3.1 positive on 9/4  4 KRSH of symptoms and admitted on 9/5  Started on decadron for hypoxia  -continue decadron, wean 02  dw Dr. Macie Barrios, likely discharge when able to ambulate without desaturating  Dewain Epley, MD  9/7/2020 10:26 AM    INFECTIOUS D Dose: 40 mg  Freq: Every morning before breakfast Route: OR  Start: 09/06/20 0700     0524-Given        0515-Given        0618-Given          Potassium Chloride ER (K-DUR M20) CR tab 40 mEq   Dose: 40 mEq  Freq:  Once Route: OR  Start: 09/05/20 1622 End:

## 2020-09-08 NOTE — PROGRESS NOTES
[facility]                INFECTIOUS DISEASE PROGRESS NOTE    Paola Hollis Patient Status:  Inpatient    8/3/1960 MRN [id number]   [facility] 5NW-A Attending Abdulkadir Castellano, [address] [facility] [address] Day # 3 PCP Vj Sky MD     [facility] 09/05/20   1.  BLOOD CULTURE     Status: None (Preliminary result)    Collection Time: 09/05/20  1:23 PM   Result Value Ref Range    Blood Culture Result No Growth 2 Days N/A         Problem list reviewed:  Patient Active Problem List:     Degeneration of c

## 2020-09-08 NOTE — PLAN OF CARE
Problem: RESPIRATORY - ADULT  Goal: Interventions of Goals  Outcome: Progressing  Goal: Achieves optimal ventilation and oxygenation  Description  INTERVENTIONS:  - Assess for changes in respiratory status  - Assess for changes in mentation and behavior Other Specialty    Watched the home discharge recovery videos related to diagnosis. .. [] Pneumonia     [] COPD    [] Home Health set up.    [] Care partner identified and updated with the plan of care.     Patient is A/Ox4, no complaints of pain at this

## 2020-09-08 NOTE — PROGRESS NOTES
[facility] hospitalist daily note  Seen/examined on 9/8/20    S; no chest pain, no fever today, no diarrhea  Frustrated that she is requiring oxygen.  Feels SOB after taking a walk    Medications in Epic    PE    09/08/20  1200   BP: 131/83   Pulse: 76   Resp: 18

## 2020-09-08 NOTE — CONSULTS
[facility] Patient Status:  Inpatient    8/3/1960 MRN [id number]   [facility] 5NW-A Attending Nellaa Master, DO   Hosp Day # 3 PCP Romy Escobar MD     Date of Admission: 2020 12:52 PM  Admission Diagnos MANAGEMENT   • LUMBAR EPIDURAL N/A 12/5/2018    Performed by Ramy Wagner MD at [address] [place] [address] N/A 1/30/2018    Performed by Ramy Wagner MD at [address] [place] [address] N/A 12/15/2017    P Medications:   •  Albuterol Sulfate  (90 Base) MCG/ACT inhaler 2 puff, 2 puff, Inhalation, Q4H PRN  •  benzonatate (TESSALON) cap 200 mg, 200 mg, Oral, TID PRN  •  sodium chloride 0.9% IV bolus 500 mL, 500 mL, Intravenous, PRN  •  Enoxaparin Sodium °C) Oral 62 18 93 %   09/07/20 1952 125/79 98.6 °F (37 °C) Oral 67 18 96 %   09/07/20 1613 124/76 99 °F (37.2 °C) Oral 74 18 91 %     O2 requirement: 2L   Wt Readings from Last 3 Encounters:  09/06/20 : 185 lb 9.6 oz (84.2 kg)  09/05/20 : 175 lb (79.4 kg) Cardiac  Recent Labs   Lab 09/05/20  1314 09/05/20  1736 09/08/20  0810   TROP <0.045 <0.045  --    PBNP 82  --  146*       Creatinine Kinase  Recent Labs   Lab 09/05/20  1314   CK 45       Inflammatory Markers  Recent Labs   Lab 09/06/20  0737 09/07

## 2020-09-09 LAB
ANION GAP SERPL CALC-SCNC: 4 MMOL/L (ref 0–18)
BUN BLD-MCNC: 19 MG/DL (ref 7–18)
BUN/CREAT SERPL: 29.7 (ref 10–20)
CALCIUM BLD-MCNC: 8.7 MG/DL (ref 8.5–10.1)
CHLORIDE SERPL-SCNC: 106 MMOL/L (ref 98–112)
CO2 SERPL-SCNC: 29 MMOL/L (ref 21–32)
CREAT BLD-MCNC: 0.64 MG/DL (ref 0.55–1.02)
D-DIMER: 0.77 UG/ML FEU (ref ?–0.59)
GLUCOSE BLD-MCNC: 140 MG/DL (ref 70–99)
HGB BLD-MCNC: 13.8 G/DL (ref 12–16)
OSMOLALITY SERPL CALC.SUM OF ELEC: 293 MOSM/KG (ref 275–295)
POTASSIUM SERPL-SCNC: 3.7 MMOL/L (ref 3.5–5.1)
SODIUM SERPL-SCNC: 139 MMOL/L (ref 136–145)

## 2020-09-09 PROCEDURE — 85379 FIBRIN DEGRADATION QUANT: CPT | Performed by: HOSPITALIST

## 2020-09-09 PROCEDURE — 80048 BASIC METABOLIC PNL TOTAL CA: CPT | Performed by: HOSPITALIST

## 2020-09-09 PROCEDURE — 85018 HEMOGLOBIN: CPT | Performed by: HOSPITALIST

## 2020-09-09 RX ORDER — POTASSIUM CHLORIDE 20 MEQ/1
40 TABLET, EXTENDED RELEASE ORAL ONCE
Status: COMPLETED | OUTPATIENT
Start: 2020-09-09 | End: 2020-09-09

## 2020-09-09 NOTE — PAYOR COMM NOTE
--------------  CONTINUED STAY REVIEW    Payor: Mikayla Grimm #:  [id number]  Authorization Number: [id number]    Admit date: 9/5/20  Admit time: 1975 Stephane Ng    Admitting Physician: Salo Hurley MD  Attending Physician:  Catracho Singh MD PM    INFECTIOUS DISEASE PROGRESS NOTE Decadron #4   Was in the [de-identified] off 02 with exertion  Resting on 1L sat 91%  Objective:  Temp:  [97.7 °F (36.5 °C)-98.5 °F (36.9 °C)] 97.9 °F (36.6 °C)  Pulse:  [59-76] 67  Resp:  [18-20] 18  BP: (115-136)/(66-94) 136/93 diastolic dysfunction. 2. Mitral valve: There was mild regurgitation. 3. Left atrium: The left atrial volume was normal.  4. Right ventricle:  The cavity size was normal. Wall thickness was normal.     Systolic function was normal.  5. Pulmonary arteries:

## 2020-09-09 NOTE — PROGRESS NOTES
Multidisciplinary Discharge Rounds held 9/9/2020. Treatment team members present today include , , Charge Nurse, Nurse, RT, PT and Pharmacy caring for Rakel Allen.        Mobility Goal: up to chair      Active issue(s) requiri

## 2020-09-09 NOTE — PROGRESS NOTES
Pulmonary Progress Note      NAME: Lizz Perez - ROOM: [address] [place], [place] [address]F - MRN: [id number] - Age: 62year old - : 8/3/1960    Assessment/Plan:  1.  Acute hypoxemic respiratory failure: 2/2 COVID PNA  -wean O2 as able - still needing about 2 LPM with activity a Labs   Lab 09/05/20  1314 09/08/20  0810   * 114*   BUN 16 14   CREATSERUM 0.76 0.54*   GFRAA 99 119   GFRNAA 86 104   CA 9.0 9.3   ALB 3.1*  --     140   K 3.3* 3.8    108   CO2 25.0 26.0   ALKPHO 74  --    AST 23  --    ALT 32  --    B

## 2020-09-09 NOTE — PLAN OF CARE
Patient Aox4. VSS. Patient maintaining O2 sats 1-2Lnc. Patient utilizes IS. PO decadron. Patient NSR on tele. Receives lovenox. Patient Voids. No c/o pain, n/v/d. Hopefully will DC tomorrow with home O2. Patient [facility]. [facility].        Problem: RESPIR

## 2020-09-09 NOTE — PROGRESS NOTES
[facility] hospitalist daily note  Seen/examined on 9/9/20     S; no chest pain, no fever today, cough is non productive and better  Hoping to be ready for discharge tomororw (per pulm if stable over the next 24 hours then it is possibility)     Medications in Ep

## 2020-09-09 NOTE — PROGRESS NOTES
[facility]                INFECTIOUS DISEASE PROGRESS NOTE    Mayco Andino Patient Status:  Inpatient    8/3/1960 MRN [id number]   [facility] 5NW-A Attending Millie Morrison, [address] [facility] Day # 4 PCP MD Timothy Ferraro No results found for: [facility] Encounter on 09/05/20   1.  BLOOD CULTURE     Status: None (Preliminary result)    Collection Time: 09/05/20  1:23 PM   Result Value Ref Range    Blood Culture Result No Growth 3 Days N/A         P

## 2020-09-10 VITALS
HEIGHT: 67 IN | SYSTOLIC BLOOD PRESSURE: 115 MMHG | TEMPERATURE: 98 F | OXYGEN SATURATION: 94 % | HEART RATE: 77 BPM | WEIGHT: 185.63 LBS | BODY MASS INDEX: 29.13 KG/M2 | RESPIRATION RATE: 18 BRPM | DIASTOLIC BLOOD PRESSURE: 66 MMHG

## 2020-09-10 LAB — POTASSIUM SERPL-SCNC: 3.5 MMOL/L (ref 3.5–5.1)

## 2020-09-10 PROCEDURE — 84132 ASSAY OF SERUM POTASSIUM: CPT | Performed by: INTERNAL MEDICINE

## 2020-09-10 RX ORDER — DEXAMETHASONE 6 MG/1
6 TABLET ORAL DAILY
Qty: 4 TABLET | Refills: 0 | Status: SHIPPED | OUTPATIENT
Start: 2020-09-11 | End: 2020-11-02

## 2020-09-10 RX ORDER — POTASSIUM CHLORIDE 20 MEQ/1
40 TABLET, EXTENDED RELEASE ORAL EVERY 4 HOURS
Status: COMPLETED | OUTPATIENT
Start: 2020-09-10 | End: 2020-09-10

## 2020-09-10 NOTE — PLAN OF CARE
Problem: RESPIRATORY - ADULT  Goal: Interventions of Goals  Outcome: Adequate for Discharge  Goal: Achieves optimal ventilation and oxygenation  Description  INTERVENTIONS:  - Assess for changes in respiratory status  - Assess for changes in mentation an

## 2020-09-10 NOTE — CM/SW NOTE
09/10/20 1400   Discharge disposition   Expected discharge disposition Home or Self   Name of Thanh HARPER Canela 94 provider [address]     Call from homer at [facility], home o2 is approved,  to deliver portable tank to room and patient to

## 2020-09-10 NOTE — PROGRESS NOTES
Pulmonary Progress Note        NAME: Miri Knowles - ROOM: 307/430-D - MRN: [id number] - Age: 62year old - : 8/3/1960        Last 24hrs: No events overnight, pt wants to go home, still needing O2    OBJECTIVE:   20  2346 09/10/20

## 2020-09-10 NOTE — DISCHARGE SUMMARY
General Medicine Discharge Summary     Patient ID:  Miri Knowles  62year old  8/3/1960    Admit date: 9/5/2020    Discharge date and time: 9/10/20    Attending Physician: DO Pooja Gill pneumonia. No sizable pleural effusion or pneumothorax is identified. IMPRESSION: Multifocal peripheral airspace disease compatible with pneumonia. Distribution can be seen with Covid-19 pneumonia.     Ct Chest Pe Aorta (iv Only) (cpt=71260)    Result Da 200 MG Oral Cap  Take 1 capsule (200 mg total) by mouth 3 (three) times daily as needed for cough. Albuterol Sulfate  (90 Base) MCG/ACT Inhalation Aero Soln  Inhale 2 puffs into the lungs every 4 (four) hours as needed for Wheezing.     Omeprazole

## 2020-09-10 NOTE — PROGRESS NOTES
Spo2% on room air at rest: 90    Spo2 ambulation on room air: 86    Spo2 ambulation on o2: 92                         On:  3 Liters per minute.

## 2020-09-10 NOTE — PROGRESS NOTES
NURSING DISCHARGE NOTE    Discharged Home via Wheelchair. Accompanied by Support staff  Belongings Taken by patient/family. Patient discharged home via wheelchair with [facility] oxygen.  Medications reviewed with patient and understanding of follow up

## 2020-09-10 NOTE — PROGRESS NOTES
[facility]                INFECTIOUS DISEASE PROGRESS NOTE    Jo Arce Patient Status:  Inpatient    8/3/1960 MRN [id number]   [facility] 5NW-A Attending Finn Wills, [address] [facility] Day # 5 PCP MD Tyree Monroy 9.3 8.7  --    ALB 3.1*  --   --   --     140 139  --    K 3.3* 3.8 3.7 3.5    108 106  --    CO2 25.0 26.0 29.0  --    ALKPHO 74  --   --   --    AST 23  --   --   --    ALT 32  --   --   --    BILT 0.4  --   --   --    TP 7.5  --   --   --

## 2020-09-10 NOTE — CM/SW NOTE
Order for home o2 sent to [facility] via [facility], await approval..    Jacqui Dominguez RN,   Phone [phone]

## 2020-12-31 PROBLEM — M48.02 CERVICAL SPINAL STENOSIS: Status: ACTIVE | Noted: 2020-12-31

## 2021-02-17 PROBLEM — M16.11 OSTEOARTHRITIS OF RIGHT HIP: Status: RESOLVED | Noted: 2019-01-04 | Resolved: 2021-02-17

## 2021-02-17 PROBLEM — Z12.11 SCREENING FOR MALIGNANT NEOPLASM OF COLON: Status: RESOLVED | Noted: 2018-02-14 | Resolved: 2021-02-17

## 2021-02-17 PROBLEM — M48.02 CERVICAL SPINAL STENOSIS: Status: RESOLVED | Noted: 2020-12-31 | Resolved: 2021-02-17

## 2021-02-17 PROBLEM — M48.061 FORAMINAL STENOSIS OF LUMBAR REGION: Status: RESOLVED | Noted: 2018-01-30 | Resolved: 2021-02-17

## 2021-02-17 PROBLEM — R09.02 HYPOXIA: Status: RESOLVED | Noted: 2020-09-05 | Resolved: 2021-02-17

## 2021-02-17 PROBLEM — M47.816 LUMBAR SPONDYLOSIS: Status: RESOLVED | Noted: 2018-01-30 | Resolved: 2021-02-17

## 2021-02-17 PROBLEM — K80.50 BILIARY COLIC: Status: RESOLVED | Noted: 2020-02-08 | Resolved: 2021-02-17

## 2021-03-31 PROBLEM — L71.9 ROSACEA: Status: ACTIVE | Noted: 2021-03-31

## 2023-05-03 NOTE — PLAN OF CARE
A/O x4. Vital signs stable. Tolerating RA with saturations of >90% when awake. Strong, non-productive cough. PRN tessalon given. IS encouraged. 2L needed while sleeping, hx of ANTONETTE, uses CPAP at home. Tele-NSR. Voiding freely. No complaints of pain.  Up self [Post-Treatment Evaluation] : post-treatment evaluation for [Lung Cancer] : lung cancer [Spouse] : spouse

## 2023-05-13 ENCOUNTER — APPOINTMENT (OUTPATIENT)
Dept: GENERAL RADIOLOGY | Facility: HOSPITAL | Age: 63
End: 2023-05-13
Attending: EMERGENCY MEDICINE
Payer: COMMERCIAL

## 2023-05-13 ENCOUNTER — HOSPITAL ENCOUNTER (EMERGENCY)
Facility: HOSPITAL | Age: 63
Discharge: HOME | End: 2023-05-13
Attending: EMERGENCY MEDICINE
Payer: COMMERCIAL

## 2023-05-13 VITALS
TEMPERATURE: 98 F | WEIGHT: 175 LBS | OXYGEN SATURATION: 99 % | HEIGHT: 67 IN | HEART RATE: 75 BPM | DIASTOLIC BLOOD PRESSURE: 90 MMHG | BODY MASS INDEX: 27.47 KG/M2 | SYSTOLIC BLOOD PRESSURE: 149 MMHG | RESPIRATION RATE: 19 BRPM

## 2023-05-13 DIAGNOSIS — S42.202A CLOSED FRACTURE OF PROXIMAL END OF LEFT HUMERUS, UNSPECIFIED FRACTURE MORPHOLOGY, INITIAL ENCOUNTER: Primary | ICD-10-CM

## 2023-05-13 PROCEDURE — 96375 TX/PRO/DX INJ NEW DRUG ADDON: CPT

## 2023-05-13 PROCEDURE — 73080 X-RAY EXAM OF ELBOW: CPT | Performed by: EMERGENCY MEDICINE

## 2023-05-13 PROCEDURE — 99284 EMERGENCY DEPT VISIT MOD MDM: CPT

## 2023-05-13 PROCEDURE — 73110 X-RAY EXAM OF WRIST: CPT | Performed by: EMERGENCY MEDICINE

## 2023-05-13 PROCEDURE — 96374 THER/PROPH/DIAG INJ IV PUSH: CPT

## 2023-05-13 PROCEDURE — 73030 X-RAY EXAM OF SHOULDER: CPT | Performed by: EMERGENCY MEDICINE

## 2023-05-13 RX ORDER — ONDANSETRON 2 MG/ML
INJECTION INTRAMUSCULAR; INTRAVENOUS
Status: COMPLETED
Start: 2023-05-13 | End: 2023-05-13

## 2023-05-13 RX ORDER — HYDROCODONE BITARTRATE AND ACETAMINOPHEN 5; 325 MG/1; MG/1
1-2 TABLET ORAL EVERY 6 HOURS PRN
Qty: 20 TABLET | Refills: 0 | Status: SHIPPED | OUTPATIENT
Start: 2023-05-13 | End: 2023-05-18

## 2023-05-13 RX ORDER — ONDANSETRON 2 MG/ML
4 INJECTION INTRAMUSCULAR; INTRAVENOUS ONCE
Status: COMPLETED | OUTPATIENT
Start: 2023-05-13 | End: 2023-05-13

## 2023-05-13 RX ORDER — HYDROMORPHONE HYDROCHLORIDE 1 MG/ML
0.5 INJECTION, SOLUTION INTRAMUSCULAR; INTRAVENOUS; SUBCUTANEOUS ONCE
Status: COMPLETED | OUTPATIENT
Start: 2023-05-13 | End: 2023-05-13

## 2023-05-13 NOTE — ED INITIAL ASSESSMENT (HPI)
Pt to ED s/p mechanical fall at nail salon. Pt had a misstep, injury to L shoulder and rest. Denies head injury, no LOC. Denies blood thinners. Pt a/ox4. Pain 8/10 to L shoulder. +nausea.    80mcg fentanyl given PTA

## 2023-05-15 ENCOUNTER — TELEPHONE (OUTPATIENT)
Dept: ORTHOPEDICS CLINIC | Facility: CLINIC | Age: 63
End: 2023-05-15

## 2023-05-15 NOTE — TELEPHONE ENCOUNTER
Patient is scheduled with Dr. Arthur Wu for a left humerus fracture. Please advise if imaging is needed.

## 2023-10-27 ENCOUNTER — OFFICE VISIT (OUTPATIENT)
Dept: PAIN CLINIC | Facility: CLINIC | Age: 63
End: 2023-10-27

## 2023-10-27 DIAGNOSIS — M25.512 LEFT SHOULDER PAIN, UNSPECIFIED CHRONICITY: ICD-10-CM

## 2023-10-27 DIAGNOSIS — M54.2 NECK PAIN ON RIGHT SIDE: Primary | ICD-10-CM

## 2023-10-29 NOTE — PROGRESS NOTES
Andrew De La Rosa is a 59year old female No chief complaint on file. .   Chief Complaint:    Neck pain right side    Self reported health status:     Patient reported severity of symptom(s) over the last 24 hours  With zero reporting no symptoms and 10 reporting the worst severity    Symptom 1: 5-6 out 10    Response to last TX:  N/A    HPI: Stephie Gordon is a very pleasant  59 old patient with a hx of proximal humerus fracture on May of 2023 after a fall in a nail salon when she had missed a step and landed on her left shoulder. Patient reports taking aleve only when pain is really bother some, trys to avoid it if possible. Aggravating factors include: reaching behind back, out to the side and overhead, lifting with L arm. Stephie Gordon also reports a Hx. of DDD in her neck and Lower back. Today she reports to having severe Neck pain, right side, with a pain level of 5-6 out of 10. 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 Face Time: 30 min,  Total Time: 50 min. Treatment performed by Angeline Wilson at [place] [address]. No follow-ups on file.     Elisa Franco L.AC  10/28/2023  7:40 PM

## 2023-11-03 ENCOUNTER — OFFICE VISIT (OUTPATIENT)
Dept: PAIN CLINIC | Facility: CLINIC | Age: 63
End: 2023-11-03
Payer: COMMERCIAL

## 2023-11-03 DIAGNOSIS — M54.2 NECK PAIN ON RIGHT SIDE: Primary | ICD-10-CM

## 2023-11-03 DIAGNOSIS — M25.512 LEFT SHOULDER PAIN, UNSPECIFIED CHRONICITY: ICD-10-CM

## 2023-11-05 NOTE — PROGRESS NOTES
Breana Marinelli is a 59year old female No chief complaint on file. .     Chief Complaint:     Neck pain right side  Left shoulder pain     Self reported health status:      Patient reported severity of symptom(s) over the last 24 hours  With zero reporting no symptoms and 10 reporting the worst severity     Symptom 1: 5-6 out 10  Symptom 2: 3; without movement    Response to last [address] [place] [address] 10/27/2023:Guerline reports the right side of her neck felt good (less pain) for a couple of days, but her neck pain is back. Her arm pain is low as long as she does not move it. She also reports she has been having difficulty with sleep. HPI initial : Radha Gamboa is a very pleasant  59 old patient with a hx of proximal humerus fracture on May of 2023 after a fall in a nail salon when she had missed a step and landed on her left shoulder. Patient reports taking aleve only when pain is really bother some, trys to avoid it if possible. Aggravating factors include: reaching behind back, out to the side and overhead, lifting with L arm. Radha Gamboa also reports a Hx. of DDD in her neck and Lower back. Today she reports to having severe Neck pain, right side, with a pain level of 5-6 out of 10. 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 Face Time: 25 min. Total Time: 55 min. Treatment performed by Carlos Granados at [facility]. No follow-ups on file.     Keshav Cummings, [facility]  11/4/2023  9:28 PM

## 2024-06-11 NOTE — PROGRESS NOTES
HISTORY OF PRESENT ILLNESS  Chief Complaint   Patient presents with    Weight Problem     Recommendation from online, no meds in the past, wants info on meds.        Guerline Angelo is a 63 year old female new to our office today for initiation of medical weight loss program.  Patient presents today with c/o excess weight.     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   Does struggle with sleep apnea   Does note need for omeprazole need goes up and down   Not needing as often     Has broke her shoulder and broken foot as well OA in certain places    Highest 185  Reason/goal for weight loss: avoid diabetes / lower A1c , likes to walk and yoga       Previous weight loss efforts in the past/medication: weight watchers    Interest in Bariatric surgery: n/a     Barriers to weight loss: n/a     Wt Readings from Last 6 Encounters:   06/11/24 172 lb (78 kg)   05/13/23 175 lb (79.4 kg)   04/20/21 175 lb (79.4 kg)   12/17/20 179 lb (81.2 kg)   12/08/20 179 lb 3.2 oz (81.3 kg)   11/25/20 186 lb (84.4 kg)          Title    General Information  Previous Weight Loss  24 Hour Food Journal  Lifestyle   Exercise   Sleep                    Breakfast Lunch Dinner Snacks Fluids   Quest bar  Soup and shrimp   Bite site shredded wheat         Social hx and lifestyle reviewed:    Work:  for food service  Marital status:   Support: good   Tobacco use: neg  ETOH use: neg   Supplements: as listed   Exercise: likes to exercise   Stress level: high /10  Sleep hours and integrity: ANTONETTE     MEDICAL HISTORY  PMH reviewed:   Cardiac disorders:negative    Depression/anxiety: negative   Glaucoma: negative   Kidney stones: negative   Eating disorder: negative   Migraines: negative   Seizures: negative    Joint-related conditions:YES   Liver disease: negative   Renal disease: negative   Diabetes: prediabetes   Thyroid disease: negative   Constipation: negative  DVT: negative    Family or personal history of Pancreatic issues / Medullary Thyroid Cancer: negative    History of bariatric surgery: negative     FMH reviewed: obesity in parent/s or sibling: YES , family history of diabetes     REVIEW OF SYSTEMS  GENERAL: feels well otherwise,   NECK: denies thickening   LUNGS: denies shortness of breath with exertion, no apnea   CARDIOVASCULAR: denies chest pain on exertion , denies palpitations or pedal edema  GI: denies abdominal pain.  No N/V/D/C  MUSCULOSKELETAL: denies joint pains   NEURO: denies headaches   PSYCH: denies change in behavior or mood, denies feeling sad or depressed     EXAM  /80   Pulse 76   Resp 16   Ht 5' 6.5\" (1.689 m)   Wt 172 lb (78 kg)   LMP 12/17/2013   BMI 27.35 kg/m² ,      GENERAL: well developed, well nourished, in no apparent distress  SKIN: warm, pink, dry without rashes to exposed area   EYES: conjunctiva pink, sclera non icteric, PERRL  HEENT: atraumatic, normocephalic, O/p: Mallampati score- 3  NECK: supple, non tender, no adenopathy, no thyromegaly,   LUNGS: CTA in all fields, breathing non labored  CARDIO: RRR without murmur, normal S1 and S2 without clicks or gallops, no pedal edema.GI: rotund, no masses, HSM or tenderness  MUSCULOSKELETAL: grossly intact   NEURO: Oriented times three, full ROM of bilateral UE/LE  PSYCH: pleasant, cooperative, normal mood and affect,     Lab Results   Component Value Date    WBC 7.41 12/19/2020    RBC 4.70 12/19/2020    HGB 14.0 12/19/2020    HCT 44.2 12/19/2020    MCV 94.0 12/19/2020    MCH 29.8 12/19/2020    MCHC 31.7 12/19/2020    RDW 12.3 12/19/2020     12/19/2020     Lab Results   Component Value Date     (H) 12/14/2020    BUN 16.0 12/14/2020    BUNCREA 24.0 (H) 12/14/2020    CREATSERUM 0.66 12/14/2020    ANIONGAP 4  09/09/2020    GFR >59 02/08/2011    GFRNAA 98 09/09/2020    GFRAA 113 09/09/2020    CA 9.4 12/14/2020    OSMOCALC 293 09/09/2020    ALKPHO 87 12/14/2020    AST 19 12/14/2020    ALT 19 12/14/2020    BILT 0.20 12/14/2020    TP 6.7 12/14/2020    ALB 4.2 12/14/2020    GLOBULIN 4.4 09/05/2020    AGRATIO 1.6 01/17/2014     12/14/2020    K 4.04 12/14/2020     12/14/2020    CO2 25.4 12/14/2020     Lab Results   Component Value Date    A1C 5.8 (A) 12/17/2020     Lab Results   Component Value Date    CHOLEST 175.00 12/14/2020    TRIG 74.00 12/14/2020    HDL 78 12/14/2020    LDL 82 12/14/2020    VLDL 15 12/14/2020     Lab Results   Component Value Date    T4F 2.53 (H) 01/20/2012    TSH 1.380 12/19/2020     Lab Results   Component Value Date    B12 408 06/01/2018    VITB12 627 01/20/2012     Lab Results   Component Value Date    VITD 35.98 12/19/2020       Current Outpatient Medications on File Prior to Visit   Medication Sig Dispense Refill    clindamycin 300 MG Oral Cap       valACYclovir 500 MG Oral Tab Take 1 tablet (500 mg total) by mouth daily.      AZELAIC ACID 15 % External Gel Apply to face every night at bedtime 50 g 0    METRONIDAZOLE 0.75 % External Cream apply topically to the affected area once daily in the morning 45 g 0    OMEPRAZOLE 40 MG Oral Capsule Delayed Release Take 1 capsule (40 mg total) by mouth before breakfast. 90 capsule 0    Bimatoprost 0.03 % External Solution Apply to upper lash line nightly 5 mL 3     No current facility-administered medications on file prior to visit.       ASSESSMENT  Initial Weight Data and Goal Weight Loss:       Diagnoses and all orders for this visit:    Therapeutic drug monitoring  -     Vitamin D; Future  -     Vitamin B12 [E]  -     Thyroid peroxidase & thyroglobulin ab [E]  -     Cortisol [E]  -     Insulin [E]    ANTONETTE on CPAP  -     Vitamin D; Future  -     Vitamin B12 [E]  -     Thyroid peroxidase & thyroglobulin ab [E]  -     Cortisol [E]  -     Insulin  [E]    Overweight (BMI 25.0-29.9)  -     Vitamin D; Future  -     Vitamin B12 [E]  -     Thyroid peroxidase & thyroglobulin ab [E]  -     Cortisol [E]  -     Insulin [E]    Prediabetes  -     Vitamin D; Future  -     Vitamin B12 [E]  -     Thyroid peroxidase & thyroglobulin ab [E]  -     Cortisol [E]  -     Insulin [E]    Primary osteoarthritis involving multiple joints    Abnormal TSH        PLAN     Body mass index is 27.35 kg/m².  Medication use and side effects reviewed with patient.  Medication contraindications: n/a   Reviewed role of weight loss in treatment of prediabetes and ANTONETTE  Check insurance coverage for wegovy and zepbound  If not covered consider qysmia  -advised of side effects and adverse effects of this medication  Reviewed   ANTONETTE- not currently on cpap , review improvement with weight loss  Goal increase strength training and regular reoutine  Prediabetes: check add'l labwork   Large fluctuations in TSH: check tpo and tg antibodies   Follow up with dietitian and psychologist as recommended.  Discussed the role of sleep and stress in weight management.  Labs orders as above.  Counseled on comprehensive weight loss plan including attention to nutrition, exercise and behavior/stress management for success. See patient instruction below for more details.  Weight Loss Consent to treat reviewed and signed.    Patient Instructions   Wegovy   Zepbound           No follow-ups on file.    Patient verbalizes understanding.    Yajaira Mathias MD

## 2024-06-11 NOTE — TELEPHONE ENCOUNTER
From: Guerline Angelo  To: Yajaira Mathias  Sent: 6/11/2024 11:10 AM CDT  Subject: Medication coverage- insurance     Thank you for today’s visit Dr. Mathias. I have confirmed that   My pharmacy plan covers both Wegovy and Zepbound. The retail pricing to me is the same for each. Both options require pre-authorization from [employer]. They indicated the fastest way for the physician to obtain authorization is to call [phone]. Please let me know if you have any questions.

## 2024-06-12 NOTE — TELEPHONE ENCOUNTER
Approved    Prior authorization approved  Payer: [employer] Case ID: [id number]    [phone]    [phone]  Note from payer: CaseId:[id number];Status:Approved;Review Type:Prior Auth;Coverage Start Date:05/13/2024;Coverage End Date:01/08/2025;  Approval Details    Authorized from May 13, 2024 to January 8, 2025  Electronic appeal: Not supported  View History  Medication Being Authorized    semaglutide-weight management 0.25 MG/0.5ML Subcutaneous Solution Auto-injector  Inject 0.5 mL (0.25 mg total) into the skin once a week for 4 doses.  Dispense: 2 mL Refills: 0   Start: 6/12/2024 End: 7/4/2024   Class: Normal Diagnoses: Therapeutic drug monitoring, ANTONETTE on CPAP, Overweight (BMI 25.0-29.9), Prediabetes, Primary osteoarthritis involving multiple joints   This order has been released to its destination.  To be filled at: [id number] - [place], [place] - [address] [phone], [phone]

## 2024-06-13 NOTE — TELEPHONE ENCOUNTER
From: Guerline Angelo  To: Yajaira Mathias  Sent: 6/12/2024 5:10 PM CDT  Subject: Test results follow up    I will do the B-12 weekly nasal spray.   Do I need to schedule a follow up to discuss the thyroid results?

## 2024-07-08 NOTE — TELEPHONE ENCOUNTER
Requesting Wegovy  LOV: 6/11/24 (weight 172 lbs)  RTC: no follow-ups on file  Last Relevant Labs: n/a  Filled: 6/12/24 wegovy 0.25mg #2ml with 0 refills    Future Appointments   Date Time Provider Department Center   8/20/2024 11:00 AM Yajaira Mathias MD [facility]   10/21/2024  1:40 PM Yajaira Mathias MD [facility]   1/7/2025  1:40 PM Yajaira Mathias MD [facility]        no

## 2024-07-08 NOTE — TELEPHONE ENCOUNTER
From: Guerline Angelo  To: Yajaira Mathias  Sent: 7/8/2024 12:35 PM CDT  Subject: Prescription questions Wegovy and B-12     Hello- I have used the first 4 Wegovy treatments without incident- I have no refills noted on the prescription and my next appointment is not for a month- should I have a refill available? My next Wegovy is this coming Sunday but I am out of the medication. Please advise     The nasal B-12 was denied by insurance- how should I arrange for B-12 shots - please advise

## 2024-07-29 NOTE — TELEPHONE ENCOUNTER
Requesting   Requested Prescriptions     Pending Prescriptions Disp Refills    WEGOVY 0.5 MG/0.5ML Subcutaneous Solution Auto-injector [Pharmacy Med Name: Wegovy 0.5 Mg/0.5ml Inj Jayant] 2 mL 0     Sig: Inject 0.5 mL (0.5 mg total) into the skin once a week for 4 doses.       LOV: 6/11/24  RTC:   Last Relevant Labs:   Filled: 7/9/24 #2ml with 0 refills    Future Appointments   Date Time Provider Department Center   8/20/2024 11:00 AM Yajaira Mathias MD [facility]   10/21/2024  1:40 PM Yajaira Mathias MD [facility]   1/7/2025  1:40 PM Yajaira Mathias MD [facility]

## 2024-08-20 NOTE — PROGRESS NOTES
HISTORY OF PRESENT ILLNESS  Chief Complaint   Patient presents with    Weight Check     Video not working          Guerline Mando Angelo is a 64 year old female here for follow up in medical weight loss program.     Denies chest pain, shortness of breath, dizziness, blurred vision, headache, paresthesia, nausea/vomiting.   Currently on wegovy   No side effects with medicaton / no side effects with medication   Feels she is tolerating medication is well   Feels hunger is down     Struggling with clean plate club     Current 155 lb         Wt Readings from Last 6 Encounters:   06/11/24 172 lb (78 kg)   05/13/23 175 lb (79.4 kg)   04/20/21 175 lb (79.4 kg)   12/17/20 179 lb (81.2 kg)   12/08/20 179 lb 3.2 oz (81.3 kg)   11/25/20 186 lb (84.4 kg)            Breakfast Lunch Dinner Snacks Fluids   Reviewed             REVIEW OF SYSTEMS  GENERAL HEALTH: feels well otherwise, denied any fevers chills or night sweats   RESPIRATORY: denies shortness of breath   CARDIOVASCULAR: denies chest pain  GI: denies abdominal pain    EXAM  LMP 12/17/2013   GENERAL: well developed, well nourished,in no apparent distress, A/O x3  SKIN: no rashes,no suspicious lesions  HEENT: atraumatic, normocephalic, OP-clear, PERRL  NECK: supple,no adenopathy  LUNGS: clear to auscultation bilaterally   CARDIO: RRR without murmur  GI: good BS's,NT/ND, no masses or HSM  EXTREMITIES: no cyanosis, no clubbing, no edema    Lab Results   Component Value Date    WBC 7.41 12/19/2020    RBC 4.70 12/19/2020    HGB 14.0 12/19/2020    HCT 44.2 12/19/2020    MCV 94.0 12/19/2020    MCH 29.8 12/19/2020    MCHC 31.7 12/19/2020    RDW 12.3 12/19/2020     12/19/2020     Lab Results   Component Value Date     (H) 12/14/2020    BUN 16.0 12/14/2020    BUNCREA 24.0 (H) 12/14/2020    CREATSERUM 0.66 12/14/2020    ANIONGAP 4 09/09/2020    GFR >59 02/08/2011    GFRNAA 98 09/09/2020    GFRAA 113 09/09/2020    CA 9.4 12/14/2020    OSMOCALC 293 09/09/2020     ALKPHO 87 12/14/2020    AST 19 12/14/2020    ALT 19 12/14/2020    BILT 0.20 12/14/2020    TP 6.7 12/14/2020    ALB 4.2 12/14/2020    GLOBULIN 4.4 09/05/2020    AGRATIO 1.6 01/17/2014     12/14/2020    K 4.04 12/14/2020     12/14/2020    CO2 25.4 12/14/2020     Lab Results   Component Value Date    A1C 5.8 (A) 12/17/2020     Lab Results   Component Value Date    CHOLEST 175.00 12/14/2020    TRIG 74.00 12/14/2020    HDL 78 12/14/2020    LDL 82 12/14/2020    VLDL 15 12/14/2020     Lab Results   Component Value Date    T4F 2.53 (H) 01/20/2012    TSH 1.380 12/19/2020     Lab Results   Component Value Date    B12 401 06/11/2024    VITB12 627 01/20/2012     Lab Results   Component Value Date    VITD 48.6 06/11/2024       Current Outpatient Medications on File Prior to Visit   Medication Sig Dispense Refill    tretinoin 0.025 % External Cream Apply at bedtime pea size amount to face      WEGOVY 0.5 MG/0.5ML Subcutaneous Solution Auto-injector Inject 0.5 mL (0.5 mg total) into the skin once a week for 4 doses. 2 mL 0    Cyanocobalamin (NASCOBAL) 500 MCG/0.1ML Nasal Solution 1 spray by Nasal route once a week. For 6 months total 4 each 5    clindamycin 300 MG Oral Cap       valACYclovir 500 MG Oral Tab Take 1 tablet (500 mg total) by mouth daily.      AZELAIC ACID 15 % External Gel Apply to face every night at bedtime 50 g 0    METRONIDAZOLE 0.75 % External Cream apply topically to the affected area once daily in the morning 45 g 0    OMEPRAZOLE 40 MG Oral Capsule Delayed Release Take 1 capsule (40 mg total) by mouth before breakfast. 90 capsule 0    Bimatoprost 0.03 % External Solution Apply to upper lash line nightly 5 mL 3     No current facility-administered medications on file prior to visit.       ASSESSMENT  Analyzed weight data:       Diagnoses and all orders for this visit:    Overweight (BMI 25.0-29.9)  -     semaglutide-weight management 0.5 MG/0.5ML Subcutaneous Solution Auto-injector; Inject 0.5 mL  (0.5 mg total) into the skin once a week for 12 doses.    Therapeutic drug monitoring  -     semaglutide-weight management 0.5 MG/0.5ML Subcutaneous Solution Auto-injector; Inject 0.5 mL (0.5 mg total) into the skin once a week for 12 doses.    ANTONETTE on CPAP  -     semaglutide-weight management 0.5 MG/0.5ML Subcutaneous Solution Auto-injector; Inject 0.5 mL (0.5 mg total) into the skin once a week for 12 doses.        PLAN   6/11/24 at 172 lb   Down to 155 lb on home scale   Coninue wegovy 0.5 mg q weekly   -advised of side effects and adverse effects of this medication  Increase physical activity     Nutrition: low carb diet/ recommended to eat breakfast daily/ regular protein intake  Medication use and side effects reviewed with patient.  Medication contraindications: n/a  Follow up with dietitian and psychologist as recommended.  Discussed the role of sleep and stress in weight management.  Counseled on comprehensive weight loss plan including attention to nutrition, exercise and behavior/stress management for success. See patient instruction below for more details.  Discussed strategies to overcome barriers to successful weight loss and weight maintenance  FITTE: ACSM recommendations (150-300 minutes/ week in active weight loss)   Weight Loss consent to treat reviewed and signed    There are no Patient Instructions on file for this visit.    No follow-ups on file.    Patient verbalizes understanding.    Yajaira Mathias MD

## 2024-09-16 NOTE — TELEPHONE ENCOUNTER
Call placed to [facility]- spoke with Deanna- requested OMAR for Wegovy 0.5mg- approval received    Case ID# [id number]  Valid 8/17/24- 10/16/25.       [facility] sent to patient informing

## 2024-09-16 NOTE — TELEPHONE ENCOUNTER
From: Guerline Angelo  To: Yajaira Mathias  Sent: 9/15/2024 6:36 PM CDT  Subject: Insurance status for medication     My pharmacy informed me that insurance rejected the Wegovy refill and that they need your office to follow-up. They claim they sent the information on Sept 9 and also faxed to your office today- September 15. I have been out of medication for 2weeks. Please advise on options.

## 2024-10-02 NOTE — TELEPHONE ENCOUNTER
Requesting   Requested Prescriptions     Pending Prescriptions Disp Refills    semaglutide-weight management 0.5 MG/0.5ML Subcutaneous Solution Auto-injector 2 mL 2     Sig: Inject 0.5 mL (0.5 mg total) into the skin once a week for 12 doses.       LOV:08/20/2024   RTC: 10/21/2024  Filled: 08/20/2023 #2ml with 2 refills    Future Appointments   Date Time Provider Department Center   10/21/2024  1:40 PM Yajaira Mathias MD [facility]   1/7/2025  1:40 PM Yajaira Mathias MD [facility]     My refill will be due on 10.14 and my next  appointment is on 10.21. Insurance is going to require another review (PAN?) before submitting an approval. I will be out of the country starting 10. 29 and am hoping to get the refill authorization completed so I can  upon my return on 10.14. Please advise

## 2024-10-17 NOTE — PROGRESS NOTES
HISTORY OF PRESENT ILLNESS  Chief Complaint   Patient presents with    Weight Check     Video         Guerline Angelo is a 64 year old female here for follow up in medical weight loss program.     Denies chest pain, shortness of breath, dizziness, blurred vision, headache, paresthesia, nausea/vomiting.     Feels she is doing well with wegovy   Reviewed struggling with shortages and feels she has been struggling prescription.   Does feel slight difference in medication and feels it takes 1-2 days to appetite suppression.   Weight is at 155 lb   Was in [place] for 2 weeks and is eating vegetarian diet and has to avoid chicken and red meat and felt that she was eating more carb heavy while traveling     Denies any side effects but occassionally lose bowels.         [facility] Follow Up    General Information  Success Moment: Less hungry  Challenging Moment: Medication availability  Nutrition Recall  Breakfast: 7 am Shredded wheat; skim milk; banana Lunch: N/A    Snacks: NA   Fluids: Water 48 oz; black coffee 12 oz Dining Out: 4   Exercise   Patient stated exercises # days/week: 3  Patient stated perceived level of   exertion: 3 Anaerobic Days: 0   Aerobic Days: 3   Patient stated average level of stress: 6  Sleep   Patient stated # hours uninterrupted sleep: 8   Patient stated feels   restful: Yes      Cause of disruption of sleep: Anxiety- primarily about work   Goals: Accelerate weight loss                Wt Readings from Last 6 Encounters:   06/11/24 172 lb (78 kg)   05/13/23 175 lb (79.4 kg)   04/20/21 175 lb (79.4 kg)   12/17/20 179 lb (81.2 kg)   12/08/20 179 lb 3.2 oz (81.3 kg)   11/25/20 186 lb (84.4 kg)            Breakfast Lunch Dinner Snacks Fluids   Reviewed             REVIEW OF SYSTEMS  GENERAL HEALTH: feels well otherwise, denied any fevers chills or night sweats   RESPIRATORY: denies shortness of breath   CARDIOVASCULAR: denies chest pain  GI: denies abdominal pain    EXAM  LMP 12/17/2013   GENERAL: well  developed, well nourished,in no apparent distress, A/O x3  SKIN: no rashes,no suspicious lesions  HEENT: atraumatic, normocephalic, OP-clear, PERRL  NECK: supple,no adenopathy  LUNGS: clear to auscultation bilaterally   CARDIO: RRR without murmur  GI: good BS's,NT/ND, no masses or HSM  EXTREMITIES: no cyanosis, no clubbing, no edema    Lab Results   Component Value Date    WBC 7.41 12/19/2020    RBC 4.70 12/19/2020    HGB 14.0 12/19/2020    HCT 44.2 12/19/2020    MCV 94.0 12/19/2020    MCH 29.8 12/19/2020    MCHC 31.7 12/19/2020    RDW 12.3 12/19/2020     12/19/2020     Lab Results   Component Value Date     (H) 12/14/2020    BUN 16.0 12/14/2020    BUNCREA 24.0 (H) 12/14/2020    CREATSERUM 0.66 12/14/2020    ANIONGAP 4 09/09/2020    GFR >59 02/08/2011    GFRNAA 98 09/09/2020    GFRAA 113 09/09/2020    CA 9.4 12/14/2020    OSMOCALC 293 09/09/2020    ALKPHO 87 12/14/2020    AST 19 12/14/2020    ALT 19 12/14/2020    BILT 0.20 12/14/2020    TP 6.7 12/14/2020    ALB 4.2 12/14/2020    GLOBULIN 4.4 09/05/2020    AGRATIO 1.6 01/17/2014     12/14/2020    K 4.04 12/14/2020     12/14/2020    CO2 25.4 12/14/2020     Lab Results   Component Value Date    A1C 5.8 (A) 12/17/2020     Lab Results   Component Value Date    CHOLEST 175.00 12/14/2020    TRIG 74.00 12/14/2020    HDL 78 12/14/2020    LDL 82 12/14/2020    VLDL 15 12/14/2020     Lab Results   Component Value Date    T4F 2.53 (H) 01/20/2012    TSH 1.380 12/19/2020     Lab Results   Component Value Date    B12 401 06/11/2024    VITB12 627 01/20/2012     Lab Results   Component Value Date    VITD 48.6 06/11/2024       Current Outpatient Medications on File Prior to Visit   Medication Sig Dispense Refill    tretinoin 0.025 % External Cream Apply at bedtime pea size amount to face      Cyanocobalamin (NASCOBAL) 500 MCG/0.1ML Nasal Solution 1 spray by Nasal route once a week. For 6 months total 4 each 5    clindamycin 300 MG Oral Cap       valACYclovir  500 MG Oral Tab Take 1 tablet (500 mg total) by mouth daily.      AZELAIC ACID 15 % External Gel Apply to face every night at bedtime 50 g 0    METRONIDAZOLE 0.75 % External Cream apply topically to the affected area once daily in the morning 45 g 0    OMEPRAZOLE 40 MG Oral Capsule Delayed Release Take 1 capsule (40 mg total) by mouth before breakfast. 90 capsule 0    Bimatoprost 0.03 % External Solution Apply to upper lash line nightly 5 mL 3     No current facility-administered medications on file prior to visit.       ASSESSMENT  Analyzed weight data:       Diagnoses and all orders for this visit:    Therapeutic drug monitoring  -     semaglutide-weight management 1 MG/0.5ML Subcutaneous Solution Auto-injector; Inject 0.5 mL (1 mg total) into the skin once a week for 4 doses.  -     semaglutide-weight management 1.7 MG/0.75ML Subcutaneous Solution Auto-injector; Inject 0.75 mL (1.7 mg total) into the skin once a week.    ANTONETTE on CPAP  -     semaglutide-weight management 1 MG/0.5ML Subcutaneous Solution Auto-injector; Inject 0.5 mL (1 mg total) into the skin once a week for 4 doses.  -     semaglutide-weight management 1.7 MG/0.75ML Subcutaneous Solution Auto-injector; Inject 0.75 mL (1.7 mg total) into the skin once a week.    Overweight (BMI 25.0-29.9)  -     semaglutide-weight management 1 MG/0.5ML Subcutaneous Solution Auto-injector; Inject 0.5 mL (1 mg total) into the skin once a week for 4 doses.  -     semaglutide-weight management 1.7 MG/0.75ML Subcutaneous Solution Auto-injector; Inject 0.75 mL (1.7 mg total) into the skin once a week.    Prediabetes  -     semaglutide-weight management 1 MG/0.5ML Subcutaneous Solution Auto-injector; Inject 0.5 mL (1 mg total) into the skin once a week for 4 doses.  -     semaglutide-weight management 1.7 MG/0.75ML Subcutaneous Solution Auto-injector; Inject 0.75 mL (1.7 mg total) into the skin once a week.          PLAN   6/11/24 at 172 lb   Down to 155 lb on home scale    Coninue wegovy to 1.0 then 1.7 mg q weekly as appropriate dose titration schedule   -advised of side effects and adverse effects of this medication    Nutrition: low carb diet/ recommended to eat breakfast daily/ regular protein intake  Medication use and side effects reviewed with patient.  Medication contraindications: n/a  Follow up with dietitian and psychologist as recommended.  Discussed the role of sleep and stress in weight management.  Counseled on comprehensive weight loss plan including attention to nutrition, exercise and behavior/stress management for success. See patient instruction below for more details.  Discussed strategies to overcome barriers to successful weight loss and weight maintenance  FITTE: ACSM recommendations (150-300 minutes/ week in active weight loss)   Weight Loss consent to treat reviewed and signed    There are no Patient Instructions on file for this visit.    No follow-ups on file.    Patient verbalizes understanding.    Yajaira Mathias MD

## 2024-12-23 NOTE — TELEPHONE ENCOUNTER
Requesting   Requested Prescriptions     Pending Prescriptions Disp Refills    WEGOVY 1.7 MG/0.75ML Subcutaneous Solution Auto-injector [Pharmacy Med Name: Wegovy 1.7 Mg/0.75ml Inj Jayant] 3 mL 0     Sig: Inject 0.75 mL (1.7 mg total) into the skin once a week.      LOV: 08/20/24  RTC: 01/07/25  Filled: 10/21/24 #3ml with 1 refills    Future Appointments   Date Time Provider Department Center   1/7/2025  1:40 PM Yajaira Mathias MD [facility]   3/24/2025 11:20 AM Yajaira Mathias MD [facility]

## 2025-01-07 NOTE — PROGRESS NOTES
HISTORY OF PRESENT ILLNESS  Chief Complaint   Patient presents with    Weight Check     Down 15        Guerline Angelo is a 64 year old female here for follow up in medical weight loss program.     Denies chest pain, shortness of breath, dizziness, blurred vision, headache, paresthesia, nausea/vomiting.     Feels she is doing well with wegovy.   Having some issues with reflux but was on advil twice per day that is recently discontinued  Goal for strength and cardiol   Goal 2-4 days and equipment at home       [facility] Follow Up    General Information  Success Moment: Maintenance of weight through holiday  Challenging Moment: Not feeling great last few weeks.  Nutrition Recall  Breakfast: Premier protein shake Lunch: 1/4 bagel, 1/2 c. Egg salad , one   slice pineapple   Dinner: Shredded wheat - non fat milk Snacks: None   Fluids: Water - 32 oz [place] team 8 oz Dining Out: 1   Exercise   Patient stated exercises # days/week: 4  Patient stated perceived level of   exertion: 2 Anaerobic Days: 4   Aerobic Days: 3   Patient stated average level of stress: 8  Sleep   Patient stated # hours uninterrupted sleep: 4   Patient stated feels   restful: No      Cause of disruption of sleep: Unsure- anxiety   Goals: Review progress                Wt Readings from Last 6 Encounters:   01/07/25 157 lb (71.2 kg)   06/11/24 172 lb (78 kg)   05/13/23 175 lb (79.4 kg)   04/20/21 175 lb (79.4 kg)   12/17/20 179 lb (81.2 kg)   12/08/20 179 lb 3.2 oz (81.3 kg)            Breakfast Lunch Dinner Snacks Fluids   Reviewed             REVIEW OF SYSTEMS  GENERAL HEALTH: feels well otherwise, denied any fevers chills or night sweats   RESPIRATORY: denies shortness of breath   CARDIOVASCULAR: denies chest pain  GI: denies abdominal pain    EXAM  /74   Pulse 85   Resp 20   Ht 5' 6.5\" (1.689 m)   Wt 157 lb (71.2 kg)   LMP 12/17/2013   BMI 24.96 kg/m²   GENERAL: well developed, well nourished,in no apparent distress, A/O x3  SKIN: no  rashes,no suspicious lesions  HEENT: atraumatic, normocephalic, OP-clear, PERRL  NECK: supple,no adenopathy  LUNGS: clear to auscultation bilaterally   CARDIO: RRR without murmur  GI: good BS's,NT/ND, no masses or HSM  EXTREMITIES: no cyanosis, no clubbing, no edema    Lab Results   Component Value Date    WBC 7.41 12/19/2020    RBC 4.70 12/19/2020    HGB 14.0 12/19/2020    HCT 44.2 12/19/2020    MCV 94.0 12/19/2020    MCH 29.8 12/19/2020    MCHC 31.7 12/19/2020    RDW 12.3 12/19/2020     12/19/2020     Lab Results   Component Value Date     (H) 12/14/2020    BUN 16.0 12/14/2020    BUNCREA 24.0 (H) 12/14/2020    CREATSERUM 0.66 12/14/2020    ANIONGAP 4 09/09/2020    GFR >59 02/08/2011    GFRNAA 98 09/09/2020    GFRAA 113 09/09/2020    CA 9.4 12/14/2020    OSMOCALC 293 09/09/2020    ALKPHO 87 12/14/2020    AST 19 12/14/2020    ALT 19 12/14/2020    BILT 0.20 12/14/2020    TP 6.7 12/14/2020    ALB 4.2 12/14/2020    GLOBULIN 4.4 09/05/2020    AGRATIO 1.6 01/17/2014     12/14/2020    K 4.04 12/14/2020     12/14/2020    CO2 25.4 12/14/2020     Lab Results   Component Value Date    A1C 5.8 (A) 12/17/2020     Lab Results   Component Value Date    CHOLEST 175.00 12/14/2020    TRIG 74.00 12/14/2020    HDL 78 12/14/2020    LDL 82 12/14/2020    VLDL 15 12/14/2020     Lab Results   Component Value Date    T4F 2.53 (H) 01/20/2012    TSH 1.380 12/19/2020     Lab Results   Component Value Date    B12 401 06/11/2024    VITB12 627 01/20/2012     Lab Results   Component Value Date    VITD 48.6 06/11/2024       Current Outpatient Medications on File Prior to Visit   Medication Sig Dispense Refill    fluocinonide 0.05 % External Cream Apply to the back of the neck twice a day for 2 weeks at a time. Not for face/folds/genitals      Meloxicam 15 MG Oral Tab Take 1 tablet (15 mg total) by mouth daily.      tretinoin 0.025 % External Cream Apply at bedtime pea size amount to face      AZELAIC ACID 15 % External Gel  Apply to face every night at bedtime 50 g 0    METRONIDAZOLE 0.75 % External Cream apply topically to the affected area once daily in the morning 45 g 0    OMEPRAZOLE 40 MG Oral Capsule Delayed Release Take 1 capsule (40 mg total) by mouth before breakfast. 90 capsule 0    Bimatoprost 0.03 % External Solution Apply to upper lash line nightly 5 mL 3     No current facility-administered medications on file prior to visit.       ASSESSMENT  Analyzed weight data:  Weight Calculations  Initial Weight: 172 lbs  Initial Weight Date: 06/11/24  Today's Weight: 157 lbs  5% Goal: 8.6  10% Goal: 17.2  Total Weight Loss: 15 lbs    Diagnoses and all orders for this visit:    Prediabetes  -     semaglutide-weight management (WEGOVY) 1.7 MG/0.75ML Subcutaneous Solution Auto-injector; Inject 0.75 mL (1.7 mg total) into the skin once a week.    Overweight (BMI 25.0-29.9)  -     semaglutide-weight management (WEGOVY) 1.7 MG/0.75ML Subcutaneous Solution Auto-injector; Inject 0.75 mL (1.7 mg total) into the skin once a week.    Therapeutic drug monitoring  -     semaglutide-weight management (WEGOVY) 1.7 MG/0.75ML Subcutaneous Solution Auto-injector; Inject 0.75 mL (1.7 mg total) into the skin once a week.    ANTONETTE on CPAP  -     semaglutide-weight management (WEGOVY) 1.7 MG/0.75ML Subcutaneous Solution Auto-injector; Inject 0.75 mL (1.7 mg total) into the skin once a week.          PLAN   6/11/24 at 172 lb   Weight is stable   Patient would like to lose 10-15 lb   Reviewed goal to incorporate more strength training for weight loss, bone density as well   Coninue wegovy 1.7 mg q weekly as appropriate dose titration schedule   -advised of side effects and adverse effects of this medication    Nutrition: low carb diet/ recommended to eat breakfast daily/ regular protein intake  Medication use and side effects reviewed with patient.  Medication contraindications: n/a  Follow up with dietitian and psychologist as recommended.  Discussed the  role of sleep and stress in weight management.  Counseled on comprehensive weight loss plan including attention to nutrition, exercise and behavior/stress management for success. See patient instruction below for more details.  Discussed strategies to overcome barriers to successful weight loss and weight maintenance  FITTE: ACSM recommendations (150-300 minutes/ week in active weight loss)   Weight Loss consent to treat reviewed and signed    There are no Patient Instructions on file for this visit.    No follow-ups on file.    Patient verbalizes understanding.    Yajaira Mathias MD

## 2025-03-03 NOTE — TELEPHONE ENCOUNTER
Requesting   Requested Prescriptions     Pending Prescriptions Disp Refills    CUSTOM MEDICATION 1 each 0     Sig: Semaglutide/ cynaocobalamin injection 1/0.5 mg/ ML , 1 mL, 1 vial Take 25 units aka 0.25 mL subcutaneous per week  Telephone Information:  Home Phone      [phone]  Mobile          [phone]      LOV: 01/07/25  RTC: 03/24/25  Filled: 02/04/25 #1 with 0 refills    Future Appointments   Date Time Provider Department Center   3/24/2025 11:20 AM Yajaira Mathias MD [facility]   6/26/2025  9:40 AM Yajaira Mathias MD [facility]

## 2025-03-24 NOTE — PROGRESS NOTES
The following individual(s) verbally consented to be recorded using ambient AI listening technology and understand that they can each withdraw their consent to this listening technology at any point by asking the clinician to turn off or pause the recording:    Patient name: Guerline Angelo  Additional names:          HISTORY OF PRESENT ILLNESS  Chief Complaint   Patient presents with    Weight Check     Video         Guerline Angelo is a 64 year old female here for follow up in medical weight loss program.   [facility] Follow Up    General Information  Success Moment: Slight continued weight loss  Challenging Moment: Getting yhe medication; medication seems different   than wegovy brand  Nutrition Recall  Breakfast: Eggs  with spinach and feta Black coffee Lunch: NA   Dinner: Stir escobedo vegetable with tofu Snacks: NA   Fluids: Camomile tea, water Dining Out: 4   Exercise   Patient stated exercises # days/week: 2  Patient stated perceived level of   exertion: 3 Anaerobic Days: 1   Aerobic Days: 2   Patient stated average level of stress: 7  Sleep   Patient stated # hours uninterrupted sleep: 7   Patient stated feels   restful: Yes      Cause of disruption of sleep: Anxiety   Goals: Discuss medication availability and dose              History of Present Illness  Guerline Angelo is a 64 year old female who presents for weight management follow-up.    She is focused on managing her weight, with her last weigh-in at home being 152.1 pounds and 150.1 pounds at a [facility]. She uses the [employer] pavel and wants to lose additional weight, particularly around her waist, which she describes as a 'fluffy spot.'    She has been using a compounded version of semaglutide (Wegovy) but has experienced issues with timely receipt, leading to a period without the medication. The compounded version was at a lower dose than expected, and she feels it has not been as effective as the name-brand  medication she previously used. She notes a palpable difference in her weight management since the change in medication.    She engages in physical activity by walking at least once a week and attempting yoga or strength training once a week. However, maintaining a consistent exercise routine is challenging due to her demanding travel schedule, which often involves early mornings and late nights. She prioritizes getting close to seven hours of sleep and eating well over physical activity when necessary.    Her past medical history includes a recent annual physical where her A1c and fasting glucose levels were within the desired range. She has a history of arthritis in her spine, hips, and hands, and suspects it may also be present in her knees. She uses a CPAP for mild to moderate obstructive sleep apnea.    Wt Readings from Last 6 Encounters:   01/07/25 157 lb (71.2 kg)   06/11/24 172 lb (78 kg)   05/13/23 175 lb (79.4 kg)   04/20/21 175 lb (79.4 kg)   12/17/20 179 lb (81.2 kg)   12/08/20 179 lb 3.2 oz (81.3 kg)              Breakfast Lunch Dinner Snacks Fluids   Reviewed           REVIEW OF SYSTEMS  GENERAL HEALTH: feels well otherwise, denied any fevers chills or night sweats   RESPIRATORY: denies shortness of breath   CARDIOVASCULAR: denies chest pain  GI: denies abdominal pain    EXAM  LMP 12/17/2013   GENERAL: well developed, well nourished,in no apparent distress, A/O x3  SKIN: no rashes,no suspicious lesions  HEENT: atraumatic, normocephalic, OP-clear, PERRL  NECK: supple,no adenopathy  LUNGS: clear to auscultation bilaterally   CARDIO: RRR without murmur  GI: good BS's,NT/ND, no masses or HSM  EXTREMITIES: no cyanosis, no clubbing, no edema    Lab Results   Component Value Date    WBC 7.41 12/19/2020    RBC 4.70 12/19/2020    HGB 14.0 12/19/2020    HCT 44.2 12/19/2020    MCV 94.0 12/19/2020    MCH 29.8 12/19/2020    MCHC 31.7 12/19/2020    RDW 12.3 12/19/2020     12/19/2020     Lab Results    Component Value Date     (H) 12/14/2020    BUN 16.0 12/14/2020    BUNCREA 24.0 (H) 12/14/2020    CREATSERUM 0.66 12/14/2020    ANIONGAP 4 09/09/2020    GFR >59 02/08/2011    GFRNAA 98 09/09/2020    GFRAA 113 09/09/2020    CA 9.4 12/14/2020    OSMOCALC 293 09/09/2020    ALKPHO 87 12/14/2020    AST 19 12/14/2020    ALT 19 12/14/2020    BILT 0.20 12/14/2020    TP 6.7 12/14/2020    ALB 4.2 12/14/2020    GLOBULIN 4.4 09/05/2020    AGRATIO 1.6 01/17/2014     12/14/2020    K 4.04 12/14/2020     12/14/2020    CO2 25.4 12/14/2020     Lab Results   Component Value Date    A1C 5.8 (A) 12/17/2020     Lab Results   Component Value Date    CHOLEST 175.00 12/14/2020    TRIG 74.00 12/14/2020    HDL 78 12/14/2020    LDL 82 12/14/2020    VLDL 15 12/14/2020     Lab Results   Component Value Date    T4F 2.53 (H) 01/20/2012    TSH 1.380 12/19/2020     Lab Results   Component Value Date    B12 401 06/11/2024    VITB12 627 01/20/2012     Lab Results   Component Value Date    VITD 48.6 06/11/2024       Medications Ordered Prior to Encounter[1]    ASSESSMENT  Analyzed weight data:     Title    [facility] Information  Patient type: Lifestyle   Initial Body Fat %: 35.8           Date of Initial Weight: 6/11/2024       Initial Weight: 167               Have any comorbidities improved since the last visit?:        Hypertension DM 2 CAD Dyslipidemia Osteoarthritis Sleep Apnea              Diagnoses and all orders for this visit:    Therapeutic drug monitoring  -     XR KNEE (1 OR 2 VIEWS), LEFT (CPT=73560); Future  -     XR KNEE (1 OR 2 VIEWS), RIGHT (CPT=73560); Future    ANTONETTE on CPAP  -     XR KNEE (1 OR 2 VIEWS), LEFT (CPT=73560); Future  -     XR KNEE (1 OR 2 VIEWS), RIGHT (CPT=73560); Future    Prediabetes  -     XR KNEE (1 OR 2 VIEWS), LEFT (CPT=73560); Future  -     XR KNEE (1 OR 2 VIEWS), RIGHT (CPT=73560); Future    Overweight (BMI 25.0-29.9)  -     XR KNEE (1 OR 2 VIEWS), LEFT (CPT=73560); Future  -     XR KNEE (1 OR 2  VIEWS), RIGHT (CPT=73560); Future    Chronic pain of both knees  -     XR KNEE (1 OR 2 VIEWS), LEFT (CPT=73560); Future  -     XR KNEE (1 OR 2 VIEWS), RIGHT (CPT=73560); Future    Primary osteoarthritis involving multiple joints    Other orders  -     semaglutide-weight management 1 MG/0.5ML Subcutaneous Solution Auto-injector; Inject 0.5 mL (1 mg total) into the skin once a week for 4 doses.        PLAN  Assessment & Plan  Obesity  Actively managing weight with [employer] pavel. Issues with compounded semaglutide due to supply and dosage problems. Considering switching to Wegovy, previously effective. FDA changes may affect compounded medication access. Wegovy available at reduced cost.  - Switch to Wegovy, start 1 mg for one month, then increase to 1.7 mg.  - Order x-rays of both knees to assess for arthritis for insurance coverage.  - Discuss with insurance about potential coverage for Zepbound, considering obstructive sleep apnea.    Obstructive Sleep Apnea  Mild to moderate obstructive sleep apnea, uses CPAP. AHI of 15 may meet criteria for Zepbound coverage.  - Discuss with insurance about potential coverage for Zepbound based on obstructive sleep apnea diagnosis.    Prediabetes-improved A1c within normal range!     Arthritis  Arthritis in spine, hips, and hands. Possible knee arthritis could assist in insurance coverage for weight management medications.  - Order x-rays of both knees to assess for arthritis and further evaluate pain.    There are no Patient Instructions on file for this visit.    No follow-ups on file.    Patient verbalizes understanding.    Yajaira Mathias MD           [1]   Current Outpatient Medications on File Prior to Visit   Medication Sig Dispense Refill    fluocinonide 0.05 % External Cream Apply to the back of the neck twice a day for 2 weeks at a time. Not for face/folds/genitals      Meloxicam 15 MG Oral Tab Take 1 tablet (15 mg total) by mouth daily.      tretinoin 0.025 % External  Cream Apply at bedtime pea size amount to face      AZELAIC ACID 15 % External Gel Apply to face every night at bedtime 50 g 0    METRONIDAZOLE 0.75 % External Cream apply topically to the affected area once daily in the morning 45 g 0    OMEPRAZOLE 40 MG Oral Capsule Delayed Release Take 1 capsule (40 mg total) by mouth before breakfast. 90 capsule 0    Bimatoprost 0.03 % External Solution Apply to upper lash line nightly 5 mL 3     No current facility-administered medications on file prior to visit.

## 2025-03-25 NOTE — TELEPHONE ENCOUNTER
MD would like us to reapply for coverage of wegovy now that patient has 2 co-morbidities  ANTONETTE and OA of knees  She sent 1 mg to pharmacy.    Entered pa for wegovy 1 mg in epic today  Attached recent note and first visit note.  Pending approval or denial

## 2025-04-21 NOTE — TELEPHONE ENCOUNTER
Requesting wegovy  LOV: 1/7/25  RTC: not noted  Last Relevant Labs: na  Filled: 3/27/25 #2 ml with 0 refills 1 mg dose    Future Appointments   Date Time Provider Department Center   6/26/2025  9:40 AM Yajaira Mathias MD [facility] [address]   10/6/2025 11:20 AM Yajaira Mathias MD [facility]

## 2025-06-16 NOTE — TELEPHONE ENCOUNTER
Requesting   Requested Prescriptions     Pending Prescriptions Disp Refills    WEGOVY 1.7 MG/0.75ML Subcutaneous Solution Auto-injector [Pharmacy Med Name: Wegovy 1.7 Mg/0.75ml Inj Jayant] 3 mL 0     Sig: Inject 0.75 mL (1.7 mg total) into the skin once a week.      LOV: 01/07/25  RTC: 06/26/25  Filled: 04/21/25 #3 with 1 refills    Future Appointments   Date Time Provider Department Center   6/26/2025  9:40 AM Yajaira Mathias MD [facility]   10/6/2025 11:20 AM Yajaira Mathias MD [facility]

## 2025-06-25 NOTE — PROGRESS NOTES
HISTORY OF PRESENT ILLNESS  Chief Complaint   Patient presents with    Weight Check     Up 1        Guerline Angelo is a 64 year old female here for follow up in medical weight loss program.   [facility] Follow Up    General Information  Nutrition Recall  Exercise     Sleep              History of Present Illness  Guerline Angelo is a 64-year-old female who presents for follow-up regarding weight management and recent health issues.    She experienced a recent episode of a rash that covered her body from head to toe, which she suspects was shingles, despite it crossing the midline. She has had shingles before and describes the sensation as similar. The rash has left some scarring, and she completed a course of prednisone for this condition. There was disagreement among three different doctors regarding the diagnosis.    Since January, she has faced issues with her insurance coverage for Wegovy, this process took three to four months to resolve. Her weight is about the same as it was in January, with a difference of only one pound. She wants to weigh under 150 pounds and acknowledges the importance of both nutrition and fitness in achieving her goals.    She uses the Weight Watcher pavel inconsistently to track her diet, focusing on protein and fiber intake. Frequent work travel limits her control over her food environment, although she prepares by carrying snacks like protein bars but struggles with portion sizes. She avoids animal proteins, consuming only cheese, nonfat cottage cheese, and yogurt.    In terms of physical activity, she reports improvement and enjoys outdoor recreation, although the recent high temperatures have been challenging. She has access to good environments for outdoor activities.    She started a 1.7 mg dose of Wegovy in April and has not yet increased to 2.4 mg.    Wt Readings from Last 6 Encounters:   06/26/25 158 lb (71.7 kg)   01/07/25 157 lb (71.2 kg)   06/11/24 172 lb (78 kg)    05/13/23 175 lb (79.4 kg)   04/20/21 175 lb (79.4 kg)   12/17/20 179 lb (81.2 kg)              Breakfast Lunch Dinner Snacks Fluids   Reviewed           REVIEW OF SYSTEMS  GENERAL HEALTH: feels well otherwise, denied any fevers chills or night sweats   RESPIRATORY: denies shortness of breath   CARDIOVASCULAR: denies chest pain  GI: denies abdominal pain    EXAM  /78   Pulse 89   Resp 18   Ht 5' 6.5\" (1.689 m)   Wt 158 lb (71.7 kg)   LMP 12/17/2013   BMI 25.12 kg/m²   GENERAL: well developed, well nourished,in no apparent distress, A/O x3  SKIN: no rashes,no suspicious lesions  HEENT: atraumatic, normocephalic, OP-clear, PERRL  NECK: supple,no adenopathy  LUNGS: clear to auscultation bilaterally   CARDIO: RRR without murmur  GI: good BS's,NT/ND, no masses or HSM  EXTREMITIES: no cyanosis, no clubbing, no edema    Lab Results   Component Value Date    WBC 7.41 12/19/2020    RBC 4.70 12/19/2020    HGB 14.0 12/19/2020    HCT 44.2 12/19/2020    MCV 94.0 12/19/2020    MCH 29.8 12/19/2020    MCHC 31.7 12/19/2020    RDW 12.3 12/19/2020     12/19/2020     Lab Results   Component Value Date     (H) 12/14/2020    BUN 16.0 12/14/2020    BUNCREA 24.0 (H) 12/14/2020    CREATSERUM 0.66 12/14/2020    ANIONGAP 4 09/09/2020    GFR >59 02/08/2011    GFRNAA 98 09/09/2020    GFRAA 113 09/09/2020    CA 9.4 12/14/2020    OSMOCALC 293 09/09/2020    ALKPHO 87 12/14/2020    AST 19 12/14/2020    ALT 19 12/14/2020    BILT 0.20 12/14/2020    TP 6.7 12/14/2020    ALB 4.2 12/14/2020    GLOBULIN 4.4 09/05/2020    AGRATIO 1.6 01/17/2014     12/14/2020    K 4.04 12/14/2020     12/14/2020    CO2 25.4 12/14/2020     Lab Results   Component Value Date    A1C 5.8 (A) 12/17/2020     Lab Results   Component Value Date    CHOLEST 175.00 12/14/2020    TRIG 74.00 12/14/2020    HDL 78 12/14/2020    LDL 82 12/14/2020    VLDL 15 12/14/2020     Lab Results   Component Value Date    T4F 2.53 (H) 01/20/2012    TSH 1.380  12/19/2020     Lab Results   Component Value Date    B12 401 06/11/2024    VITB12 627 01/20/2012     Lab Results   Component Value Date    VITD 48.6 06/11/2024       Medications Ordered Prior to Encounter[1]    ASSESSMENT  Analyzed weight data:     [facility] Information  Patient type: Lifestyle   Initial Body Fat %: 35.8      Date of Initial Weight: 6/11/2024 Initial Weight: 167     Initial BMI: 26.6         Have any comorbidities improved since the last visit?   Hypertension DM 2 Dyslipidemia Osteoarthritis Sleep Apnea                    Diagnoses and all orders for this visit:    Therapeutic drug monitoring    ANTONETTE on CPAP    Overweight (BMI 25.0-29.9)    B12 deficiency    Prediabetes        PLAN  Assessment & Plan  Obesity  Obesity management ongoing. She has been on Wegovy 1.7 mg since April, interested in 2.4 mg dose. Maintaining weight with goal under 150 pounds. Challenges with diet due to travel. Avoids animal proteins except cheese, nonfat cottage cheese, yogurt.  - Increase Wegovy to 2.4 mg, provide 3-4 months refills.  - Encourage tracking protein and fiber intake using Weight Watcher pavel.  - Discuss benefits of dietitian visit for support.  - Encourage outdoor physical activity as tolerated.  - Schedule follow-up in October.    There are no Patient Instructions on file for this visit.    No follow-ups on file.    Patient verbalizes understanding.    Yajaira Mathias MD           [1]   Current Outpatient Medications on File Prior to Visit   Medication Sig Dispense Refill    Bimatoprost 0.03 % Ophthalmic Solution AAPLY 1 DROP EVERY NIGHT TO EYELIDS AND EYEBROWS. PLEASE INCLUDE EYELISH APPLICATOR      WEGOVY 1.7 MG/0.75ML Subcutaneous Solution Auto-injector Inject 0.75 mL (1.7 mg total) into the skin once a week. 3 mL 0    fluocinonide 0.05 % External Cream Apply to the back of the neck twice a day for 2 weeks at a time. Not for face/folds/genitals      Meloxicam 15 MG Oral Tab Take 1 tablet (15 mg total) by mouth  daily.      tretinoin 0.025 % External Cream Apply at bedtime pea size amount to face      AZELAIC ACID 15 % External Gel Apply to face every night at bedtime 50 g 0    METRONIDAZOLE 0.75 % External Cream apply topically to the affected area once daily in the morning 45 g 0    OMEPRAZOLE 40 MG Oral Capsule Delayed Release Take 1 capsule (40 mg total) by mouth before breakfast. 90 capsule 0    Bimatoprost 0.03 % External Solution Apply to upper lash line nightly 5 mL 3     No current facility-administered medications on file prior to visit.

## (undated) DEVICE — GAMMEX® PI HYBRID SIZE 6.5, STERILE POWDER-FREE SURGICAL GLOVE, POLYISOPRENE AND NEOPRENE BLEND: Brand: GAMMEX

## (undated) DEVICE — UNDYED BRAIDED (POLYGLACTIN 910), SYNTHETIC ABSORBABLE SUTURE: Brand: COATED VICRYL

## (undated) DEVICE — TOTAL HIP CDS: Brand: MEDLINE INDUSTRIES, INC.

## (undated) DEVICE — TROCAR: Brand: KII® SLEEVE

## (undated) DEVICE — SUTURE VICRYL 0 UR-6

## (undated) DEVICE — #11 STERILE BLADE: Brand: POLYMER COATED BLADES

## (undated) DEVICE — GAUZE SPONGES,12 PLY: Brand: CURITY

## (undated) DEVICE — DERMABOND LIQUID ADHESIVE

## (undated) DEVICE — ANTIBACTERIAL UNDYED BRAIDED (POLYGLACTIN 910), SYNTHETIC ABSORBABLE SUTURE: Brand: COATED VICRYL

## (undated) DEVICE — CHLORAPREP 26ML APPLICATOR

## (undated) DEVICE — SOL  .9 1000ML BTL

## (undated) DEVICE — TROCAR: Brand: KII SHIELDED BLADED ACCESS SYSTEM

## (undated) DEVICE — STERILE POLYISOPRENE POWDER-FREE SURGICAL GLOVES: Brand: PROTEXIS

## (undated) DEVICE — TIGERTAIL 5F FLXTIP 70CM

## (undated) DEVICE — ABDOMINAL PAD: Brand: DERMACEA

## (undated) DEVICE — SYRINGE 20CC LL TIP

## (undated) DEVICE — STANDARD HYPODERMIC NEEDLE,POLYPROPYLENE HUB: Brand: MONOJECT

## (undated) DEVICE — DRAPE C-ARM UNIVERSAL

## (undated) DEVICE — DRESSING AQUACEL AG 3.5 X 10

## (undated) DEVICE — SUTURE MONOCRYL 3-0 PS-2

## (undated) DEVICE — ALCOHOL 70% 4 OZ

## (undated) DEVICE — TISSUE RETRIEVAL SYSTEM: Brand: INZII RETRIEVAL SYSTEM

## (undated) DEVICE — Device

## (undated) DEVICE — NEEDLE SPINAL 18X3-1/2 PINK.

## (undated) DEVICE — WRAP COOLING HIP W/ICE PILLOW

## (undated) DEVICE — SYRINGE 50ML LL TIP

## (undated) DEVICE — HOOD, PEEL-AWAY: Brand: FLYTE

## (undated) DEVICE — SYRINGE 10ML LL TIP

## (undated) DEVICE — TROCAR: Brand: KII FIOS FIRST ENTRY

## (undated) DEVICE — KENDALL SCD EXPRESS SLEEVES, KNEE LENGTH, MEDIUM: Brand: KENDALL SCD

## (undated) DEVICE — ANTIBACTERIAL VIOLET BRAIDED (POLYGLACTIN 910), SYNTHETIC ABSORBABLE SUTURE: Brand: COATED VICRYL

## (undated) DEVICE — Device: Brand: STABLECUT®

## (undated) DEVICE — ABSORBABLE HEMOSTAT (OXIDIZED REGENERATED CELLULOSE, U.S.P.): Brand: SURGICEL

## (undated) DEVICE — SPECIMEN CONTAINER,POSITIVE SEAL INDICATOR, OR PACKAGED: Brand: PRECISION

## (undated) DEVICE — COOK STRANGE BILE DUCT STONE EXPLORATION SET: Brand: STRANGE

## (undated) DEVICE — LAP CHOLE/APPY CDS-LF: Brand: MEDLINE INDUSTRIES, INC.

## (undated) DEVICE — SOL  .9 3000ML

## (undated) DEVICE — ENDOPATH ULTRA VERESS INSUFFLATION NEEDLES WITH LUER LOCK CONNECTORS: Brand: ENDOPATH

## (undated) DEVICE — BLADE ELECTRODE: Brand: EDGE

## (undated) DEVICE — DISPOSABLE LAPAROSCOPIC CLIP APPLIER WITH 20 CLIPS.: Brand: EPIX® UNIVERSAL CLIP APPLIER

## (undated) NOTE — IP AVS SNAPSHOT
Patient Demographics     Address  [address] [facility] [address] Phone  [phone] [facility]  [phone] [facility] *Preferred* E-mail Address  [email or web address]      Emergency Contact(s)     Name Relation Home Work Mobile    NAVNEET [place] before then. Please review instructions on proper removal of the dressing. ? If dressing becomes saturated before the 7 day jose, please remove the Aquacel Ag dressing sooner. ?  Non-stapled incisions: the incision may be left open to air if there is no d o 1.  There is pus like discharge from the incision  o 2. Your temperature is over 101 degrees  o 3. It is painful to move the leg through a gentle range of motion  ?  IF ANY OF THESE THREE OCCUR PLEASE CONTACT OUR OFFICE IMMEDIATELY or if it is after [place] your pre-op or post-op appointment if you feel you will need a placard. If you feel you need a handicap placard for longer than 6 months from surgery please contact your primary care physician.     13. If you have any questions related to medical issues un changed sooner. To remove the dressing, see instructions below.         Aquacel Removal  Press down on the skin with one hand and carefully lift an edge of the dressing with your other hand, then stretch the dressing to break the adhesive seal.  Stretching Edison Schwab, MD. Schedule an appointment as soon as possible for a visit in 1 week.     Specialty:  Internal Medicine  Contact information:  [facility] [address]  [phone]                  Your medication list Juan Jhaveri PA-C         Pantoprazole Sodium 40 MG Tbec  Commonly known as:  PROTONIX  Next dose due:  1/12 @ 0700 am      Take 1 tablet (40 mg total) by mouth every morning before breakfast.   Loretta Fiore PA-C         RaNITidine HCl 150 MG Caps  Comm [id number] acetaminophen (TYLENOL) tab 650 mg 01/10/19 1454 Given      [id number] aspirin tab 325 mg 01/10/19 2011 Given      [id number] aspirin tab 325 mg 01/11/19 0954 Given      [id number] clindamycin (CLEOCIN) in D5W 900mg/50ml premix 01/10/19 1823 New Ba Type Source Collected On   Blood — 01/10/19 1923          Components    Component Value Reference Range Flag Lab   Glucose 143 70 - 99 mg/dL [facility]   Sodium 137 136 - 144 mmol/L — [facility]   Potassium 3.6 3.6 - 5.1 mmol/L — ward Lab   Chloride 10 Testing Performed By     Armen Monge Name Director Address Valid Date Range    [facility] [id number] 02/03/16 1201 - Present            Microbiology Results (All)     None procedure well without any immediate complications. Specifically, she denies N/V, lightheadness, chest pain, SOB, cough or fever. Her pain is currently controlled on his current regimen.       Past Medical Hx:  Past Medical History:   Diagnosis Date   • Ar Performed by Hunter Costello MD at [address] [facility] N/A 11/27/2017    Performed by Hunter Costello MD at [address]   • SKIN SURGERY  3/7/17    MMS of [address] to left upper lip done by AB      Social Histo - IV narcotics prn, transition to PO as tolerated    GERD: home meds  ANTONETTE:[KT.1] Doesn't wear CPAP at home, may need in house with narcotics on board- discussed with patient[KT. 3]      Prevention: ASA BID per Ortho recs, SCDs, bowel regimen      Outpatient • SLEEP APNEA DMG DX 8-19-11    AHI 14/ RDI 19/ Supine AHI 23/ lateral AHI 1.5/ SaO2 88%   • Sleep apnea, obstructive DMG  TX  8-27-11    CPAP 8cwp;    • Visual impairment     glasses       Past Surgical History  Past Surgical History:   Procedure Cherl Peed BALANCE  Static Sitting: Good  Dynamic Sitting: Good  Static Standing: Fair -  Dynamic Standing: Fair -    ADDITIONAL TESTS                                 ACTIVITY TOLERANCE           BP: 108/84  BP Location: Right arm  BP Method: Automatic  Patient Posit on heel toe pattern, upright posture. Pt returned to bs chair.   Pt encouraged to also amb with nsg staff.        Exercise/Education Provided:  Bed mobility  Energy conservation  Functional activity tolerated  Gait training  Posture  Lower therapeutic exer Number of Visits to Meet Established Goals: [address] #1  Patient is able to demonstrate supine - sit EOB @ level: supervision     Goal #2  Patient is able to demonstrate transfers Sit to/from Stand at assistance level: supervison     Goal # • Lumbar spinal stenosis 2017   • Osteoarthritis    • SLEEP APNEA DMG DX 8-19-11    AHI 14/ RDI 19/ Supine AHI 23/ lateral AHI 1.5/ SaO2 88%   • Sleep apnea, obstructive DMG  TX  8-27-11    CPAP 8cwp;    • Visual impairment     glasses       Past Surgical RANGE OF MOTION AND STRENGTH ASSESSMENT  Upper extremity ROM is within functional limits     Upper extremity strength is within functional limits     NEUROLOGICAL FINDINGS  Neurological Findings: None                ACTIVITY TOLERANCE Patient End of Session: Up in chair;Needs met;Call light within reach;RN aware of session/findings; All patient questions and concerns addressed;SCDs in place; Ice applied; Family present    ASSESSMENT   Patient is a[BW.3 63year old[BW.2] female admitted 1/ level or above; patient reports will have supervision at home[BW.1]        Attribution Cantrell    BW.1 - Ceferino Kapoor OT on 1/11/2019  8:23 AM  BW.2 - Ceferino Kapoor OT on 1/11/2019  8:24 AM                     Video Swallow Study Notes     No note

## (undated) NOTE — LETTER
Casie Jones 182 6 [address]  [place], [address] [place] [address]    Consent for Operation  Date: __________________                                Time: _______________    1.  I authorize the performance upon Easton Andrade the following operation:  Procedure( procedure has been videotaped, the surgeon will obtain the original videotape. The hospital will not be responsible for storage or maintenance of this tape.     6. For the purpose of advancing medical education, I consent to the admittance of observers to t STATEMENTS REQUIRING INSERTION OR COMPLETION WERE FILLED IN.     Signature of Patient:   ___________________________    When the patient is a minor or mentally incompetent to give consent:  Signature of person authorized to consent for patient: ____________

## (undated) NOTE — LETTER
[facility] Testing Department  Phone: [phone]  Right Fax: [phone]  [facility] 20 Mina Hooker RN Date: 12/26/18    Patient Name: Tyrel Brennan  Surgery Date: 1/10/2019    CSN: [id number]  Medical Record: [id number]   D

## (undated) NOTE — IP AVS SNAPSHOT
[address]/[facility] Services            (For Outpatient Use Only) Initial Admit Date: 1/10/2019   Inpt/Obs Admit Date: Inpt: 1/10/19 / Obs: N/A   Discharge Date:    Gio Mendoza:  [de-identified]   MRN: [de-identified]   CSN: [id number]        ENCOUNTER  Patient Hospital Account Financial Class: Managed Care    January 11, 2019

## (undated) NOTE — ED AVS SNAPSHOT
Robbie Huerta   MRN: [id number]    Department:  [facility] Emergency Department   Date of Visit:  7/11/2018           Disclosure     Insurance plans vary and the physician(s) referred by the ER may not be covered by your plan.  Please contact your tell this physician (or your personal doctor if your instructions are to return to your personal doctor) about any new or lasting problems. The primary care or specialist physician will see patients referred from the [facility] Emergency Department.  Brad Dangelo

## (undated) NOTE — LETTER
Casie Jones 182 6 13[facility] E  [place], [address] [place] [address]    Consent for Operation  Date: __________________                                Time: _______________    1.  I authorize the performance upon Miri Knowles the following operation:  Procedure( procedure has been videotaped, the surgeon will obtain the original videotape. The hospital will not be responsible for storage or maintenance of this tape.   7. For the purpose of advancing medical education, I consent to the admittance of observers to the STATEMENTS REQUIRING INSERTION OR COMPLETION WERE FILLED IN.     Signature of Patient:   ___________________________    When the patient is a minor or mentally incompetent to give consent:  Signature of person authorized to consent for patient: ____________ supplements, and pills I can buy without a prescription (including street drugs/illegal medications). Failure to inform my anesthesiologist about these medicines may increase my risk of anesthetic complications. iv.  If I am allergic to anything or have ha Anesthesiologist Signature     Date   Time  I have discussed the procedure and information above with the patient (or patient’s representative) and answered their questions. The patient or their representative has agreed to have anesthesia services.     ___

## (undated) NOTE — LETTER
02/08/20    Chyna Ibarra      To Whom It May Concern:     This letter has been written at the patient's request. The above patient was seen at [facility] for treatment of a medical condition from ***    The patient may return to work/school on *** wi